# Patient Record
Sex: MALE | Race: WHITE | NOT HISPANIC OR LATINO | URBAN - METROPOLITAN AREA
[De-identification: names, ages, dates, MRNs, and addresses within clinical notes are randomized per-mention and may not be internally consistent; named-entity substitution may affect disease eponyms.]

---

## 2017-01-03 ENCOUNTER — EMERGENCY (EMERGENCY)
Facility: HOSPITAL | Age: 42
LOS: 0 days | Discharge: HOME | End: 2017-01-03
Admitting: EMERGENCY MEDICINE
Payer: COMMERCIAL

## 2017-01-03 DIAGNOSIS — R07.9 CHEST PAIN, UNSPECIFIED: ICD-10-CM

## 2017-01-03 DIAGNOSIS — I10 ESSENTIAL (PRIMARY) HYPERTENSION: ICD-10-CM

## 2017-01-03 PROCEDURE — 93010 ELECTROCARDIOGRAM REPORT: CPT

## 2017-01-03 PROCEDURE — 71020: CPT | Mod: 26

## 2017-01-03 PROCEDURE — 99285 EMERGENCY DEPT VISIT HI MDM: CPT

## 2017-01-12 ENCOUNTER — TRANSCRIPTION ENCOUNTER (OUTPATIENT)
Age: 42
End: 2017-01-12

## 2017-05-17 ENCOUNTER — OUTPATIENT (OUTPATIENT)
Dept: OUTPATIENT SERVICES | Facility: HOSPITAL | Age: 42
LOS: 1 days | Discharge: HOME | End: 2017-05-17
Payer: COMMERCIAL

## 2017-05-17 VITALS
HEIGHT: 73 IN | WEIGHT: 259.93 LBS | SYSTOLIC BLOOD PRESSURE: 120 MMHG | RESPIRATION RATE: 15 BRPM | OXYGEN SATURATION: 98 % | TEMPERATURE: 98 F | HEART RATE: 76 BPM | DIASTOLIC BLOOD PRESSURE: 75 MMHG

## 2017-05-17 DIAGNOSIS — M50.223 OTHER CERVICAL DISC DISPLACEMENT AT C6-C7 LEVEL: ICD-10-CM

## 2017-05-17 DIAGNOSIS — Z92.241 PERSONAL HISTORY OF SYSTEMIC STEROID THERAPY: Chronic | ICD-10-CM

## 2017-05-17 DIAGNOSIS — Z98.890 OTHER SPECIFIED POSTPROCEDURAL STATES: Chronic | ICD-10-CM

## 2017-05-17 DIAGNOSIS — Z98.1 ARTHRODESIS STATUS: Chronic | ICD-10-CM

## 2017-05-17 DIAGNOSIS — M50.222 OTHER CERVICAL DISC DISPLACEMENT AT C5-C6 LEVEL: ICD-10-CM

## 2017-05-17 DIAGNOSIS — M54.12 RADICULOPATHY, CERVICAL REGION: ICD-10-CM

## 2017-05-17 LAB
ANION GAP SERPL CALC-SCNC: 9 MMOL/L — SIGNIFICANT CHANGE UP (ref 5–17)
APPEARANCE UR: CLEAR — SIGNIFICANT CHANGE UP
APTT BLD: 35.4 SEC — SIGNIFICANT CHANGE UP (ref 27.5–37.4)
BASOPHILS # BLD AUTO: 0.2 K/UL — SIGNIFICANT CHANGE UP (ref 0–0.2)
BASOPHILS NFR BLD AUTO: 1.7 % — SIGNIFICANT CHANGE UP (ref 0–2)
BILIRUB UR-MCNC: NEGATIVE — SIGNIFICANT CHANGE UP
BUN SERPL-MCNC: 8 MG/DL — SIGNIFICANT CHANGE UP (ref 7–23)
CALCIUM SERPL-MCNC: 9.1 MG/DL — SIGNIFICANT CHANGE UP (ref 8.5–10.1)
CHLORIDE SERPL-SCNC: 107 MMOL/L — SIGNIFICANT CHANGE UP (ref 96–108)
CO2 SERPL-SCNC: 25 MMOL/L — SIGNIFICANT CHANGE UP (ref 22–31)
COLOR SPEC: YELLOW — SIGNIFICANT CHANGE UP
CREAT SERPL-MCNC: 1.01 MG/DL — SIGNIFICANT CHANGE UP (ref 0.5–1.3)
DIFF PNL FLD: NEGATIVE — SIGNIFICANT CHANGE UP
EOSINOPHIL # BLD AUTO: 0.1 K/UL — SIGNIFICANT CHANGE UP (ref 0–0.5)
EOSINOPHIL NFR BLD AUTO: 1 % — SIGNIFICANT CHANGE UP (ref 0–6)
GLUCOSE SERPL-MCNC: 104 MG/DL — HIGH (ref 70–99)
GLUCOSE UR QL: NEGATIVE MG/DL — SIGNIFICANT CHANGE UP
HCT VFR BLD CALC: 51.7 % — HIGH (ref 39–50)
HGB BLD-MCNC: 17.5 G/DL — HIGH (ref 13–17)
INR BLD: 1.07 RATIO — SIGNIFICANT CHANGE UP (ref 0.88–1.16)
KETONES UR-MCNC: NEGATIVE — SIGNIFICANT CHANGE UP
LEUKOCYTE ESTERASE UR-ACNC: NEGATIVE — SIGNIFICANT CHANGE UP
LYMPHOCYTES # BLD AUTO: 1.9 K/UL — SIGNIFICANT CHANGE UP (ref 1–3.3)
LYMPHOCYTES # BLD AUTO: 19.4 % — SIGNIFICANT CHANGE UP (ref 13–44)
MCHC RBC-ENTMCNC: 30.7 PG — SIGNIFICANT CHANGE UP (ref 27–34)
MCHC RBC-ENTMCNC: 33.8 GM/DL — SIGNIFICANT CHANGE UP (ref 32–36)
MCV RBC AUTO: 90.8 FL — SIGNIFICANT CHANGE UP (ref 80–100)
MONOCYTES # BLD AUTO: 0.7 K/UL — SIGNIFICANT CHANGE UP (ref 0–0.9)
MONOCYTES NFR BLD AUTO: 6.9 % — SIGNIFICANT CHANGE UP (ref 2–14)
MRSA PCR RESULT.: SIGNIFICANT CHANGE UP
NEUTROPHILS # BLD AUTO: 6.9 K/UL — SIGNIFICANT CHANGE UP (ref 1.8–7.4)
NEUTROPHILS NFR BLD AUTO: 71 % — SIGNIFICANT CHANGE UP (ref 43–77)
NITRITE UR-MCNC: NEGATIVE — SIGNIFICANT CHANGE UP
PH UR: 6 — SIGNIFICANT CHANGE UP (ref 5–8)
PLATELET # BLD AUTO: 204 K/UL — SIGNIFICANT CHANGE UP (ref 150–400)
POTASSIUM SERPL-MCNC: 4 MMOL/L — SIGNIFICANT CHANGE UP (ref 3.5–5.3)
POTASSIUM SERPL-SCNC: 4 MMOL/L — SIGNIFICANT CHANGE UP (ref 3.5–5.3)
PROT UR-MCNC: NEGATIVE MG/DL — SIGNIFICANT CHANGE UP
PROTHROM AB SERPL-ACNC: 11.6 SEC — SIGNIFICANT CHANGE UP (ref 9.8–12.7)
RBC # BLD: 5.7 M/UL — SIGNIFICANT CHANGE UP (ref 4.2–5.8)
RBC # FLD: 12 % — SIGNIFICANT CHANGE UP (ref 10.3–14.5)
S AUREUS DNA NOSE QL NAA+PROBE: SIGNIFICANT CHANGE UP
SODIUM SERPL-SCNC: 141 MMOL/L — SIGNIFICANT CHANGE UP (ref 135–145)
SP GR SPEC: 1.01 — SIGNIFICANT CHANGE UP (ref 1.01–1.02)
TYPE + AB SCN PNL BLD: SIGNIFICANT CHANGE UP
UROBILINOGEN FLD QL: NEGATIVE MG/DL — SIGNIFICANT CHANGE UP
WBC # BLD: 9.7 K/UL — SIGNIFICANT CHANGE UP (ref 3.8–10.5)
WBC # FLD AUTO: 9.7 K/UL — SIGNIFICANT CHANGE UP (ref 3.8–10.5)

## 2017-05-17 PROCEDURE — 71020: CPT | Mod: 26

## 2017-05-17 PROCEDURE — 93010 ELECTROCARDIOGRAM REPORT: CPT

## 2017-05-17 NOTE — H&P PST ADULT - ASSESSMENT
42 yo male scheduled for  ACDF & related procedures with Dr. Luke on 5/25/17    1. Labs as per surgeon  2. EKG  3. Medical clearance with PCP Dr Serrato  4. discussed EZ sponges, mupirocin & day of procedure instructions  5. CXR  6. instructed to bring in CPAP machine

## 2017-05-17 NOTE — H&P PST ADULT - PSH
History of back surgery  removal of cyst in lumbar spine 2012  S/P epidural steroid injection  lumbar spine  S/P lumbar fusion  2006, 2012, 2013
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
## 2017-05-17 NOTE — H&P PST ADULT - PMH
Back pain    Cervical radiculopathy    Herniated cervical disc    Neck pain    Sleep apnea  CPAP machine

## 2017-05-17 NOTE — H&P PST ADULT - FAMILY HISTORY
Mother  Still living? Yes, Estimated age: 51-60  Family history of ovarian cancer, Age at diagnosis: Age Unknown

## 2017-05-25 ENCOUNTER — INPATIENT (INPATIENT)
Facility: HOSPITAL | Age: 42
LOS: 1 days | Discharge: HOME | End: 2017-05-27
Attending: ORTHOPAEDIC SURGERY | Admitting: NEUROLOGICAL SURGERY
Payer: COMMERCIAL

## 2017-05-25 ENCOUNTER — RESULT REVIEW (OUTPATIENT)
Age: 42
End: 2017-05-25

## 2017-05-25 VITALS
SYSTOLIC BLOOD PRESSURE: 143 MMHG | DIASTOLIC BLOOD PRESSURE: 85 MMHG | HEIGHT: 73 IN | TEMPERATURE: 98 F | RESPIRATION RATE: 15 BRPM | HEART RATE: 68 BPM | WEIGHT: 259.93 LBS | OXYGEN SATURATION: 98 %

## 2017-05-25 DIAGNOSIS — Z98.890 OTHER SPECIFIED POSTPROCEDURAL STATES: Chronic | ICD-10-CM

## 2017-05-25 DIAGNOSIS — Z92.241 PERSONAL HISTORY OF SYSTEMIC STEROID THERAPY: Chronic | ICD-10-CM

## 2017-05-25 DIAGNOSIS — Z98.1 ARTHRODESIS STATUS: Chronic | ICD-10-CM

## 2017-05-25 LAB
ANION GAP SERPL CALC-SCNC: 8 MMOL/L — SIGNIFICANT CHANGE UP (ref 5–17)
BASOPHILS # BLD AUTO: 0.1 K/UL — SIGNIFICANT CHANGE UP (ref 0–0.2)
BASOPHILS NFR BLD AUTO: 1 % — SIGNIFICANT CHANGE UP (ref 0–2)
BUN SERPL-MCNC: 12 MG/DL — SIGNIFICANT CHANGE UP (ref 7–23)
CALCIUM SERPL-MCNC: 8.8 MG/DL — SIGNIFICANT CHANGE UP (ref 8.5–10.1)
CHLORIDE SERPL-SCNC: 110 MMOL/L — HIGH (ref 96–108)
CO2 SERPL-SCNC: 23 MMOL/L — SIGNIFICANT CHANGE UP (ref 22–31)
CREAT SERPL-MCNC: 1.01 MG/DL — SIGNIFICANT CHANGE UP (ref 0.5–1.3)
EOSINOPHIL # BLD AUTO: 0.1 K/UL — SIGNIFICANT CHANGE UP (ref 0–0.5)
EOSINOPHIL NFR BLD AUTO: 0.8 % — SIGNIFICANT CHANGE UP (ref 0–6)
GLUCOSE SERPL-MCNC: 122 MG/DL — HIGH (ref 70–99)
HCT VFR BLD CALC: 44.8 % — SIGNIFICANT CHANGE UP (ref 39–50)
HGB BLD-MCNC: 15.5 G/DL — SIGNIFICANT CHANGE UP (ref 13–17)
LYMPHOCYTES # BLD AUTO: 1.3 K/UL — SIGNIFICANT CHANGE UP (ref 1–3.3)
LYMPHOCYTES # BLD AUTO: 16.6 % — SIGNIFICANT CHANGE UP (ref 13–44)
MCHC RBC-ENTMCNC: 30.7 PG — SIGNIFICANT CHANGE UP (ref 27–34)
MCHC RBC-ENTMCNC: 34.7 GM/DL — SIGNIFICANT CHANGE UP (ref 32–36)
MCV RBC AUTO: 88.6 FL — SIGNIFICANT CHANGE UP (ref 80–100)
MONOCYTES # BLD AUTO: 0.2 K/UL — SIGNIFICANT CHANGE UP (ref 0–0.9)
MONOCYTES NFR BLD AUTO: 3.1 % — SIGNIFICANT CHANGE UP (ref 2–14)
NEUTROPHILS # BLD AUTO: 6.2 K/UL — SIGNIFICANT CHANGE UP (ref 1.8–7.4)
NEUTROPHILS NFR BLD AUTO: 78.5 % — HIGH (ref 43–77)
PLATELET # BLD AUTO: 184 K/UL — SIGNIFICANT CHANGE UP (ref 150–400)
POTASSIUM SERPL-MCNC: 4.8 MMOL/L — SIGNIFICANT CHANGE UP (ref 3.5–5.3)
POTASSIUM SERPL-SCNC: 4.8 MMOL/L — SIGNIFICANT CHANGE UP (ref 3.5–5.3)
RBC # BLD: 5.05 M/UL — SIGNIFICANT CHANGE UP (ref 4.2–5.8)
RBC # FLD: 11.5 % — SIGNIFICANT CHANGE UP (ref 10.3–14.5)
SODIUM SERPL-SCNC: 141 MMOL/L — SIGNIFICANT CHANGE UP (ref 135–145)
WBC # BLD: 7.9 K/UL — SIGNIFICANT CHANGE UP (ref 3.8–10.5)
WBC # FLD AUTO: 7.9 K/UL — SIGNIFICANT CHANGE UP (ref 3.8–10.5)

## 2017-05-25 PROCEDURE — 88304 TISSUE EXAM BY PATHOLOGIST: CPT | Mod: 26

## 2017-05-25 RX ORDER — CEFAZOLIN SODIUM 1 G
2000 VIAL (EA) INJECTION EVERY 8 HOURS
Qty: 0 | Refills: 0 | Status: COMPLETED | OUTPATIENT
Start: 2017-05-25 | End: 2017-05-26

## 2017-05-25 RX ORDER — MAGNESIUM HYDROXIDE 400 MG/1
30 TABLET, CHEWABLE ORAL EVERY 12 HOURS
Qty: 0 | Refills: 0 | Status: DISCONTINUED | OUTPATIENT
Start: 2017-05-25 | End: 2017-05-27

## 2017-05-25 RX ORDER — SODIUM CHLORIDE 9 MG/ML
3 INJECTION INTRAMUSCULAR; INTRAVENOUS; SUBCUTANEOUS EVERY 8 HOURS
Qty: 0 | Refills: 0 | Status: DISCONTINUED | OUTPATIENT
Start: 2017-05-25 | End: 2017-05-27

## 2017-05-25 RX ORDER — DOCUSATE SODIUM 100 MG
100 CAPSULE ORAL THREE TIMES A DAY
Qty: 0 | Refills: 0 | Status: DISCONTINUED | OUTPATIENT
Start: 2017-05-25 | End: 2017-05-27

## 2017-05-25 RX ORDER — SODIUM CHLORIDE 9 MG/ML
1000 INJECTION, SOLUTION INTRAVENOUS
Qty: 0 | Refills: 0 | Status: DISCONTINUED | OUTPATIENT
Start: 2017-05-25 | End: 2017-05-27

## 2017-05-25 RX ORDER — HYDROMORPHONE HYDROCHLORIDE 2 MG/ML
2 INJECTION INTRAMUSCULAR; INTRAVENOUS; SUBCUTANEOUS EVERY 6 HOURS
Qty: 0 | Refills: 0 | Status: DISCONTINUED | OUTPATIENT
Start: 2017-05-26 | End: 2017-05-27

## 2017-05-25 RX ORDER — CYCLOBENZAPRINE HYDROCHLORIDE 10 MG/1
10 TABLET, FILM COATED ORAL THREE TIMES A DAY
Qty: 0 | Refills: 0 | Status: DISCONTINUED | OUTPATIENT
Start: 2017-05-25 | End: 2017-05-27

## 2017-05-25 RX ORDER — ACETAMINOPHEN 500 MG
650 TABLET ORAL EVERY 6 HOURS
Qty: 0 | Refills: 0 | Status: DISCONTINUED | OUTPATIENT
Start: 2017-05-25 | End: 2017-05-27

## 2017-05-25 RX ORDER — ACETAMINOPHEN 500 MG
975 TABLET ORAL ONCE
Qty: 0 | Refills: 0 | Status: DISCONTINUED | OUTPATIENT
Start: 2017-05-25 | End: 2017-05-27

## 2017-05-25 RX ORDER — ONDANSETRON 8 MG/1
4 TABLET, FILM COATED ORAL EVERY 6 HOURS
Qty: 0 | Refills: 0 | Status: DISCONTINUED | OUTPATIENT
Start: 2017-05-25 | End: 2017-05-25

## 2017-05-25 RX ORDER — NALOXONE HYDROCHLORIDE 4 MG/.1ML
0.1 SPRAY NASAL
Qty: 0 | Refills: 0 | Status: DISCONTINUED | OUTPATIENT
Start: 2017-05-25 | End: 2017-05-27

## 2017-05-25 RX ORDER — FAMOTIDINE 10 MG/ML
20 INJECTION INTRAVENOUS EVERY 12 HOURS
Qty: 0 | Refills: 0 | Status: DISCONTINUED | OUTPATIENT
Start: 2017-05-25 | End: 2017-05-27

## 2017-05-25 RX ORDER — OXYCODONE HYDROCHLORIDE 5 MG/1
10 TABLET ORAL ONCE
Qty: 0 | Refills: 0 | Status: COMPLETED | OUTPATIENT
Start: 2017-05-25 | End: 2017-05-25

## 2017-05-25 RX ORDER — SODIUM CHLORIDE 9 MG/ML
1000 INJECTION, SOLUTION INTRAVENOUS
Qty: 0 | Refills: 0 | Status: DISCONTINUED | OUTPATIENT
Start: 2017-05-25 | End: 2017-05-26

## 2017-05-25 RX ORDER — HYDROMORPHONE HYDROCHLORIDE 2 MG/ML
30 INJECTION INTRAMUSCULAR; INTRAVENOUS; SUBCUTANEOUS
Qty: 0 | Refills: 0 | Status: DISCONTINUED | OUTPATIENT
Start: 2017-05-25 | End: 2017-05-26

## 2017-05-25 RX ORDER — ACETAMINOPHEN 500 MG
325 TABLET ORAL EVERY 4 HOURS
Qty: 0 | Refills: 0 | Status: DISCONTINUED | OUTPATIENT
Start: 2017-05-25 | End: 2017-05-27

## 2017-05-25 RX ORDER — ONDANSETRON 8 MG/1
4 TABLET, FILM COATED ORAL EVERY 6 HOURS
Qty: 0 | Refills: 0 | Status: DISCONTINUED | OUTPATIENT
Start: 2017-05-25 | End: 2017-05-27

## 2017-05-25 RX ORDER — HYDROMORPHONE HYDROCHLORIDE 2 MG/ML
0.5 INJECTION INTRAMUSCULAR; INTRAVENOUS; SUBCUTANEOUS
Qty: 0 | Refills: 0 | Status: DISCONTINUED | OUTPATIENT
Start: 2017-05-25 | End: 2017-05-26

## 2017-05-25 RX ADMIN — SODIUM CHLORIDE 75 MILLILITER(S): 9 INJECTION, SOLUTION INTRAVENOUS at 16:19

## 2017-05-25 RX ADMIN — ONDANSETRON 4 MILLIGRAM(S): 8 TABLET, FILM COATED ORAL at 23:38

## 2017-05-25 RX ADMIN — Medication 100 MILLIGRAM(S): at 21:02

## 2017-05-25 RX ADMIN — SODIUM CHLORIDE 3 MILLILITER(S): 9 INJECTION INTRAMUSCULAR; INTRAVENOUS; SUBCUTANEOUS at 21:02

## 2017-05-25 RX ADMIN — HYDROMORPHONE HYDROCHLORIDE 30 MILLILITER(S): 2 INJECTION INTRAMUSCULAR; INTRAVENOUS; SUBCUTANEOUS at 16:17

## 2017-05-26 LAB
ANION GAP SERPL CALC-SCNC: 5 MMOL/L — SIGNIFICANT CHANGE UP (ref 5–17)
BASOPHILS # BLD AUTO: 0.1 K/UL — SIGNIFICANT CHANGE UP (ref 0–0.2)
BASOPHILS NFR BLD AUTO: 0.5 % — SIGNIFICANT CHANGE UP (ref 0–2)
BUN SERPL-MCNC: 9 MG/DL — SIGNIFICANT CHANGE UP (ref 7–23)
CALCIUM SERPL-MCNC: 8.8 MG/DL — SIGNIFICANT CHANGE UP (ref 8.5–10.1)
CHLORIDE SERPL-SCNC: 106 MMOL/L — SIGNIFICANT CHANGE UP (ref 96–108)
CO2 SERPL-SCNC: 26 MMOL/L — SIGNIFICANT CHANGE UP (ref 22–31)
CREAT SERPL-MCNC: 0.99 MG/DL — SIGNIFICANT CHANGE UP (ref 0.5–1.3)
EOSINOPHIL # BLD AUTO: 0 K/UL — SIGNIFICANT CHANGE UP (ref 0–0.5)
EOSINOPHIL NFR BLD AUTO: 0 % — SIGNIFICANT CHANGE UP (ref 0–6)
GLUCOSE SERPL-MCNC: 119 MG/DL — HIGH (ref 70–99)
HCT VFR BLD CALC: 43.4 % — SIGNIFICANT CHANGE UP (ref 39–50)
HGB BLD-MCNC: 15.5 G/DL — SIGNIFICANT CHANGE UP (ref 13–17)
LYMPHOCYTES # BLD AUTO: 1.4 K/UL — SIGNIFICANT CHANGE UP (ref 1–3.3)
LYMPHOCYTES # BLD AUTO: 7.9 % — LOW (ref 13–44)
MCHC RBC-ENTMCNC: 31.3 PG — SIGNIFICANT CHANGE UP (ref 27–34)
MCHC RBC-ENTMCNC: 35.7 GM/DL — SIGNIFICANT CHANGE UP (ref 32–36)
MCV RBC AUTO: 87.7 FL — SIGNIFICANT CHANGE UP (ref 80–100)
MONOCYTES # BLD AUTO: 0.9 K/UL — SIGNIFICANT CHANGE UP (ref 0–0.9)
MONOCYTES NFR BLD AUTO: 5.1 % — SIGNIFICANT CHANGE UP (ref 2–14)
NEUTROPHILS # BLD AUTO: 15.4 K/UL — HIGH (ref 1.8–7.4)
NEUTROPHILS NFR BLD AUTO: 86.5 % — HIGH (ref 43–77)
PLATELET # BLD AUTO: 176 K/UL — SIGNIFICANT CHANGE UP (ref 150–400)
POTASSIUM SERPL-MCNC: 4.1 MMOL/L — SIGNIFICANT CHANGE UP (ref 3.5–5.3)
POTASSIUM SERPL-SCNC: 4.1 MMOL/L — SIGNIFICANT CHANGE UP (ref 3.5–5.3)
RBC # BLD: 4.95 M/UL — SIGNIFICANT CHANGE UP (ref 4.2–5.8)
RBC # FLD: 11.5 % — SIGNIFICANT CHANGE UP (ref 10.3–14.5)
SODIUM SERPL-SCNC: 137 MMOL/L — SIGNIFICANT CHANGE UP (ref 135–145)
WBC # BLD: 17.8 K/UL — HIGH (ref 3.8–10.5)
WBC # FLD AUTO: 17.8 K/UL — HIGH (ref 3.8–10.5)

## 2017-05-26 RX ORDER — PROCHLORPERAZINE MALEATE 5 MG
10 TABLET ORAL EVERY 8 HOURS
Qty: 0 | Refills: 0 | Status: DISCONTINUED | OUTPATIENT
Start: 2017-05-26 | End: 2017-05-27

## 2017-05-26 RX ADMIN — Medication 100 MILLIGRAM(S): at 05:51

## 2017-05-26 RX ADMIN — Medication 100 MILLIGRAM(S): at 22:21

## 2017-05-26 RX ADMIN — Medication 1 TABLET(S): at 22:21

## 2017-05-26 RX ADMIN — SODIUM CHLORIDE 3 MILLILITER(S): 9 INJECTION INTRAMUSCULAR; INTRAVENOUS; SUBCUTANEOUS at 22:22

## 2017-05-26 RX ADMIN — SODIUM CHLORIDE 3 MILLILITER(S): 9 INJECTION INTRAMUSCULAR; INTRAVENOUS; SUBCUTANEOUS at 05:52

## 2017-05-26 RX ADMIN — Medication 100 MILLIGRAM(S): at 14:36

## 2017-05-26 RX ADMIN — Medication 10 MILLIGRAM(S): at 00:09

## 2017-05-26 RX ADMIN — Medication 1 TABLET(S): at 14:36

## 2017-05-26 RX ADMIN — Medication 100 MILLIGRAM(S): at 06:06

## 2017-05-26 RX ADMIN — SODIUM CHLORIDE 3 MILLILITER(S): 9 INJECTION INTRAMUSCULAR; INTRAVENOUS; SUBCUTANEOUS at 14:34

## 2017-05-26 RX ADMIN — Medication 1 TABLET(S): at 06:15

## 2017-05-26 NOTE — PROGRESS NOTE ADULT - SUBJECTIVE AND OBJECTIVE BOX
POD #1  S/p ACDF    Doing OK overnight    Voided    On PCA    > 60 cc AMARILYS output    Afeb    Drsg dry  Abd soft  Slight left triceps weakness, improved numbness  No DVT  Lungs clr     - Doing well   - D/C PCA - transfer to floor   - Too much drainage to be D/C   - Will keep one more day and probably be able to D/C Drain and D/ C in AM    KOSTAS Beth MD

## 2017-05-27 VITALS
OXYGEN SATURATION: 95 % | SYSTOLIC BLOOD PRESSURE: 124 MMHG | HEART RATE: 76 BPM | DIASTOLIC BLOOD PRESSURE: 73 MMHG | TEMPERATURE: 98 F | RESPIRATION RATE: 18 BRPM

## 2017-05-27 LAB
ANION GAP SERPL CALC-SCNC: 9 MMOL/L — SIGNIFICANT CHANGE UP (ref 5–17)
BASOPHILS # BLD AUTO: 0.1 K/UL — SIGNIFICANT CHANGE UP (ref 0–0.2)
BASOPHILS NFR BLD AUTO: 0.9 % — SIGNIFICANT CHANGE UP (ref 0–2)
BUN SERPL-MCNC: 5 MG/DL — LOW (ref 7–23)
CALCIUM SERPL-MCNC: 9.2 MG/DL — SIGNIFICANT CHANGE UP (ref 8.5–10.1)
CHLORIDE SERPL-SCNC: 105 MMOL/L — SIGNIFICANT CHANGE UP (ref 96–108)
CO2 SERPL-SCNC: 28 MMOL/L — SIGNIFICANT CHANGE UP (ref 22–31)
CREAT SERPL-MCNC: 0.87 MG/DL — SIGNIFICANT CHANGE UP (ref 0.5–1.3)
EOSINOPHIL # BLD AUTO: 0.1 K/UL — SIGNIFICANT CHANGE UP (ref 0–0.5)
EOSINOPHIL NFR BLD AUTO: 0.9 % — SIGNIFICANT CHANGE UP (ref 0–6)
GLUCOSE SERPL-MCNC: 105 MG/DL — HIGH (ref 70–99)
HCT VFR BLD CALC: 44.6 % — SIGNIFICANT CHANGE UP (ref 39–50)
HGB BLD-MCNC: 15.2 G/DL — SIGNIFICANT CHANGE UP (ref 13–17)
LYMPHOCYTES # BLD AUTO: 2.7 K/UL — SIGNIFICANT CHANGE UP (ref 1–3.3)
LYMPHOCYTES # BLD AUTO: 26.9 % — SIGNIFICANT CHANGE UP (ref 13–44)
MCHC RBC-ENTMCNC: 30.8 PG — SIGNIFICANT CHANGE UP (ref 27–34)
MCHC RBC-ENTMCNC: 34.1 GM/DL — SIGNIFICANT CHANGE UP (ref 32–36)
MCV RBC AUTO: 90.4 FL — SIGNIFICANT CHANGE UP (ref 80–100)
MONOCYTES # BLD AUTO: 0.9 K/UL — SIGNIFICANT CHANGE UP (ref 0–0.9)
MONOCYTES NFR BLD AUTO: 9.3 % — SIGNIFICANT CHANGE UP (ref 2–14)
NEUTROPHILS # BLD AUTO: 6.2 K/UL — SIGNIFICANT CHANGE UP (ref 1.8–7.4)
NEUTROPHILS NFR BLD AUTO: 62 % — SIGNIFICANT CHANGE UP (ref 43–77)
PLATELET # BLD AUTO: 158 K/UL — SIGNIFICANT CHANGE UP (ref 150–400)
POTASSIUM SERPL-MCNC: 3.6 MMOL/L — SIGNIFICANT CHANGE UP (ref 3.5–5.3)
POTASSIUM SERPL-SCNC: 3.6 MMOL/L — SIGNIFICANT CHANGE UP (ref 3.5–5.3)
RBC # BLD: 4.93 M/UL — SIGNIFICANT CHANGE UP (ref 4.2–5.8)
RBC # FLD: 11.7 % — SIGNIFICANT CHANGE UP (ref 10.3–14.5)
SODIUM SERPL-SCNC: 142 MMOL/L — SIGNIFICANT CHANGE UP (ref 135–145)
WBC # BLD: 10 K/UL — SIGNIFICANT CHANGE UP (ref 3.8–10.5)
WBC # FLD AUTO: 10 K/UL — SIGNIFICANT CHANGE UP (ref 3.8–10.5)

## 2017-05-27 RX ORDER — CYCLOBENZAPRINE HYDROCHLORIDE 10 MG/1
10 TABLET, FILM COATED ORAL
Qty: 0 | Refills: 0 | COMMUNITY

## 2017-05-27 RX ORDER — CETIRIZINE HYDROCHLORIDE 10 MG/1
1 TABLET ORAL
Qty: 0 | Refills: 0 | COMMUNITY

## 2017-05-27 RX ADMIN — Medication 1 TABLET(S): at 05:28

## 2017-05-27 RX ADMIN — SODIUM CHLORIDE 3 MILLILITER(S): 9 INJECTION INTRAMUSCULAR; INTRAVENOUS; SUBCUTANEOUS at 05:32

## 2017-05-27 RX ADMIN — Medication 100 MILLIGRAM(S): at 05:28

## 2017-05-27 NOTE — DISCHARGE NOTE ADULT - CARE PLAN
Principal Discharge DX:	Chronic bilateral low back pain without sciatica  Goal:	fusion  Instructions for follow-up, activity and diet:	keep wound clean and dry x 48 hours  Goal:	fusion

## 2017-05-27 NOTE — DISCHARGE NOTE ADULT - CARE PROVIDER_API CALL
Aurelio Luke), Orthopaedic Surgery  [address] [place] [address]  Phone: [phone]  Fax: [phone]
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
## 2017-05-27 NOTE — DISCHARGE NOTE ADULT - PATIENT PORTAL LINK FT
“You can access the Follow[facility] Patient Portal, offered by [facility], by registering with the following website: [email or web address]”

## 2017-05-31 DIAGNOSIS — G47.33 OBSTRUCTIVE SLEEP APNEA (ADULT) (PEDIATRIC): ICD-10-CM

## 2017-05-31 DIAGNOSIS — M48.02 SPINAL STENOSIS, CERVICAL REGION: ICD-10-CM

## 2017-05-31 DIAGNOSIS — I10 ESSENTIAL (PRIMARY) HYPERTENSION: ICD-10-CM

## 2017-05-31 DIAGNOSIS — M50.122 CERVICAL DISC DISORDER AT C5-C6 LEVEL WITH RADICULOPATHY: ICD-10-CM

## 2017-05-31 DIAGNOSIS — M50.323 OTHER CERVICAL DISC DEGENERATION AT C6-C7 LEVEL: ICD-10-CM

## 2017-05-31 DIAGNOSIS — M43.22 FUSION OF SPINE, CERVICAL REGION: ICD-10-CM

## 2017-05-31 DIAGNOSIS — M50.123 CERVICAL DISC DISORDER AT C6-C7 LEVEL WITH RADICULOPATHY: ICD-10-CM

## 2017-05-31 LAB — SURGICAL PATHOLOGY FINAL REPORT - CH: SIGNIFICANT CHANGE UP

## 2017-11-16 ENCOUNTER — OUTPATIENT (OUTPATIENT)
Dept: OUTPATIENT SERVICES | Facility: HOSPITAL | Age: 42
LOS: 1 days | End: 2017-11-16
Payer: COMMERCIAL

## 2017-11-16 VITALS
TEMPERATURE: 99 F | HEART RATE: 70 BPM | OXYGEN SATURATION: 95 % | RESPIRATION RATE: 16 BRPM | HEIGHT: 74 IN | DIASTOLIC BLOOD PRESSURE: 77 MMHG | SYSTOLIC BLOOD PRESSURE: 121 MMHG | WEIGHT: 261.03 LBS

## 2017-11-16 DIAGNOSIS — Z98.890 OTHER SPECIFIED POSTPROCEDURAL STATES: Chronic | ICD-10-CM

## 2017-11-16 DIAGNOSIS — Z01.818 ENCOUNTER FOR OTHER PREPROCEDURAL EXAMINATION: ICD-10-CM

## 2017-11-16 DIAGNOSIS — Z98.1 ARTHRODESIS STATUS: Chronic | ICD-10-CM

## 2017-11-16 DIAGNOSIS — M54.16 RADICULOPATHY, LUMBAR REGION: ICD-10-CM

## 2017-11-16 DIAGNOSIS — Z92.241 PERSONAL HISTORY OF SYSTEMIC STEROID THERAPY: Chronic | ICD-10-CM

## 2017-11-16 LAB
ANION GAP SERPL CALC-SCNC: 8 MMOL/L — SIGNIFICANT CHANGE UP (ref 5–17)
APTT BLD: 37.4 SEC — SIGNIFICANT CHANGE UP (ref 27.5–37.4)
BUN SERPL-MCNC: 10 MG/DL — SIGNIFICANT CHANGE UP (ref 7–23)
CALCIUM SERPL-MCNC: 9.4 MG/DL — SIGNIFICANT CHANGE UP (ref 8.4–10.5)
CHLORIDE SERPL-SCNC: 101 MMOL/L — SIGNIFICANT CHANGE UP (ref 96–108)
CO2 SERPL-SCNC: 27 MMOL/L — SIGNIFICANT CHANGE UP (ref 22–31)
CREAT SERPL-MCNC: 0.97 MG/DL — SIGNIFICANT CHANGE UP (ref 0.5–1.3)
GLUCOSE SERPL-MCNC: 113 MG/DL — HIGH (ref 70–99)
HCT VFR BLD CALC: 44.8 % — SIGNIFICANT CHANGE UP (ref 39–50)
HGB BLD-MCNC: 15.3 G/DL — SIGNIFICANT CHANGE UP (ref 13–17)
INR BLD: 1.05 RATIO — SIGNIFICANT CHANGE UP (ref 0.88–1.16)
MCHC RBC-ENTMCNC: 30.8 PG — SIGNIFICANT CHANGE UP (ref 27–34)
MCHC RBC-ENTMCNC: 34.2 GM/DL — SIGNIFICANT CHANGE UP (ref 32–36)
MCV RBC AUTO: 90.2 FL — SIGNIFICANT CHANGE UP (ref 80–100)
PLATELET # BLD AUTO: 206 K/UL — SIGNIFICANT CHANGE UP (ref 150–400)
POTASSIUM SERPL-MCNC: 4.3 MMOL/L — SIGNIFICANT CHANGE UP (ref 3.5–5.3)
POTASSIUM SERPL-SCNC: 4.3 MMOL/L — SIGNIFICANT CHANGE UP (ref 3.5–5.3)
PROTHROM AB SERPL-ACNC: 11.5 SEC — SIGNIFICANT CHANGE UP (ref 9.8–12.7)
RBC # BLD: 4.96 M/UL — SIGNIFICANT CHANGE UP (ref 4.2–5.8)
RBC # FLD: 11.3 % — SIGNIFICANT CHANGE UP (ref 10.3–14.5)
SODIUM SERPL-SCNC: 136 MMOL/L — SIGNIFICANT CHANGE UP (ref 135–145)
WBC # BLD: 6.5 K/UL — SIGNIFICANT CHANGE UP (ref 3.8–10.5)
WBC # FLD AUTO: 6.5 K/UL — SIGNIFICANT CHANGE UP (ref 3.8–10.5)

## 2017-11-16 PROCEDURE — 93010 ELECTROCARDIOGRAM REPORT: CPT | Mod: NC

## 2017-11-16 NOTE — H&P PST ADULT - MUSCULOSKELETAL
detailed exam no calf tenderness/decreased ROM/no joint swelling/no joint warmth/generalized decreased strength and ROM secondary to neck and back pain/decreased ROM due to pain/no joint erythema/diminished strength details…

## 2017-11-16 NOTE — H&P PST ADULT - PSH
History of back surgery  removal of cyst in lumbar spine 2012  S/P cervical spinal fusion  C4-5 and C5-6, May 2017  S/P epidural steroid injection  lumbar spine  S/P lumbar fusion  2006, 2012, 2013
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
## 2017-11-16 NOTE — H&P PST ADULT - FAMILY HISTORY
Mother  Still living? Yes, Estimated age: 51-60  Family history of ovarian cancer, Age at diagnosis: Age Unknown  Family history of thyroid disease, Age at diagnosis: Age Unknown

## 2017-11-27 ENCOUNTER — OUTPATIENT (OUTPATIENT)
Dept: OUTPATIENT SERVICES | Facility: HOSPITAL | Age: 42
LOS: 1 days | End: 2017-11-27
Payer: COMMERCIAL

## 2017-11-27 DIAGNOSIS — Z98.890 OTHER SPECIFIED POSTPROCEDURAL STATES: Chronic | ICD-10-CM

## 2017-11-27 DIAGNOSIS — Z98.1 ARTHRODESIS STATUS: Chronic | ICD-10-CM

## 2017-11-27 DIAGNOSIS — M54.16 RADICULOPATHY, LUMBAR REGION: ICD-10-CM

## 2017-11-27 DIAGNOSIS — Z92.241 PERSONAL HISTORY OF SYSTEMIC STEROID THERAPY: Chronic | ICD-10-CM

## 2017-11-27 PROCEDURE — 72020 X-RAY EXAM OF SPINE 1 VIEW: CPT | Mod: 26

## 2017-11-27 PROCEDURE — 63650 IMPLANT NEUROELECTRODES: CPT

## 2017-11-27 PROCEDURE — C1778: CPT

## 2018-01-10 ENCOUNTER — APPOINTMENT (OUTPATIENT)
Dept: SPINE | Facility: CLINIC | Age: 43
End: 2018-01-10
Payer: COMMERCIAL

## 2018-01-10 VITALS
DIASTOLIC BLOOD PRESSURE: 77 MMHG | SYSTOLIC BLOOD PRESSURE: 107 MMHG | BODY MASS INDEX: 33.37 KG/M2 | HEART RATE: 73 BPM | WEIGHT: 260 LBS | HEIGHT: 74 IN

## 2018-01-10 DIAGNOSIS — Z78.9 OTHER SPECIFIED HEALTH STATUS: ICD-10-CM

## 2018-01-10 DIAGNOSIS — Z82.49 FAMILY HISTORY OF ISCHEMIC HEART DISEASE AND OTHER DISEASES OF THE CIRCULATORY SYSTEM: ICD-10-CM

## 2018-01-10 DIAGNOSIS — M54.42 LUMBAGO WITH SCIATICA, LEFT SIDE: ICD-10-CM

## 2018-01-10 DIAGNOSIS — Z82.3 FAMILY HISTORY OF STROKE: ICD-10-CM

## 2018-01-10 DIAGNOSIS — Z80.1 FAMILY HISTORY OF MALIGNANT NEOPLASM OF TRACHEA, BRONCHUS AND LUNG: ICD-10-CM

## 2018-01-10 DIAGNOSIS — G89.29 LUMBAGO WITH SCIATICA, LEFT SIDE: ICD-10-CM

## 2018-01-10 DIAGNOSIS — Z83.3 FAMILY HISTORY OF DIABETES MELLITUS: ICD-10-CM

## 2018-01-10 PROCEDURE — 99244 OFF/OP CNSLTJ NEW/EST MOD 40: CPT

## 2018-01-10 RX ORDER — IBUPROFEN 200 MG/1
TABLET, COATED ORAL
Refills: 0 | Status: ACTIVE | COMMUNITY

## 2018-01-10 RX ORDER — CYCLOBENZAPRINE HYDROCHLORIDE 10 MG/1
10 TABLET, FILM COATED ORAL
Qty: 90 | Refills: 0 | Status: ACTIVE | COMMUNITY
Start: 2017-11-06

## 2018-01-10 RX ORDER — ACETAMINOPHEN 325 MG/1
TABLET, FILM COATED ORAL
Refills: 0 | Status: ACTIVE | COMMUNITY

## 2018-01-25 ENCOUNTER — TRANSCRIPTION ENCOUNTER (OUTPATIENT)
Age: 43
End: 2018-01-25

## 2018-02-02 ENCOUNTER — OUTPATIENT (OUTPATIENT)
Dept: OUTPATIENT SERVICES | Facility: HOSPITAL | Age: 43
LOS: 1 days | End: 2018-02-02
Payer: COMMERCIAL

## 2018-02-02 DIAGNOSIS — M54.12 RADICULOPATHY, CERVICAL REGION: ICD-10-CM

## 2018-02-02 DIAGNOSIS — Z98.1 ARTHRODESIS STATUS: Chronic | ICD-10-CM

## 2018-02-02 DIAGNOSIS — Z92.241 PERSONAL HISTORY OF SYSTEMIC STEROID THERAPY: Chronic | ICD-10-CM

## 2018-02-02 DIAGNOSIS — Z98.890 OTHER SPECIFIED POSTPROCEDURAL STATES: Chronic | ICD-10-CM

## 2018-02-02 PROCEDURE — 85027 COMPLETE CBC AUTOMATED: CPT

## 2018-02-02 PROCEDURE — 80048 BASIC METABOLIC PNL TOTAL CA: CPT

## 2018-02-02 PROCEDURE — 85610 PROTHROMBIN TIME: CPT

## 2018-02-02 PROCEDURE — G0463: CPT

## 2018-02-02 PROCEDURE — 93005 ELECTROCARDIOGRAM TRACING: CPT

## 2018-02-02 PROCEDURE — 62321 NJX INTERLAMINAR CRV/THRC: CPT

## 2018-02-02 PROCEDURE — 76000 FLUOROSCOPY <1 HR PHYS/QHP: CPT

## 2018-02-02 PROCEDURE — 85730 THROMBOPLASTIN TIME PARTIAL: CPT

## 2018-02-02 PROCEDURE — 72020 X-RAY EXAM OF SPINE 1 VIEW: CPT

## 2018-02-02 PROCEDURE — 77003 FLUOROGUIDE FOR SPINE INJECT: CPT

## 2018-02-16 ENCOUNTER — OUTPATIENT (OUTPATIENT)
Dept: OUTPATIENT SERVICES | Facility: HOSPITAL | Age: 43
LOS: 1 days | End: 2018-02-16
Payer: COMMERCIAL

## 2018-02-16 VITALS
RESPIRATION RATE: 18 BRPM | OXYGEN SATURATION: 98 % | HEIGHT: 74.5 IN | HEART RATE: 82 BPM | SYSTOLIC BLOOD PRESSURE: 109 MMHG | DIASTOLIC BLOOD PRESSURE: 77 MMHG | WEIGHT: 255.07 LBS | TEMPERATURE: 98 F

## 2018-02-16 DIAGNOSIS — Z01.818 ENCOUNTER FOR OTHER PREPROCEDURAL EXAMINATION: ICD-10-CM

## 2018-02-16 DIAGNOSIS — Z98.1 ARTHRODESIS STATUS: Chronic | ICD-10-CM

## 2018-02-16 DIAGNOSIS — Z98.890 OTHER SPECIFIED POSTPROCEDURAL STATES: Chronic | ICD-10-CM

## 2018-02-16 DIAGNOSIS — M54.16 RADICULOPATHY, LUMBAR REGION: ICD-10-CM

## 2018-02-16 DIAGNOSIS — Z92.241 PERSONAL HISTORY OF SYSTEMIC STEROID THERAPY: Chronic | ICD-10-CM

## 2018-02-16 DIAGNOSIS — M54.42 LUMBAGO WITH SCIATICA, LEFT SIDE: ICD-10-CM

## 2018-02-16 LAB
HCT VFR BLD CALC: 46.3 % — SIGNIFICANT CHANGE UP (ref 39–50)
HGB BLD-MCNC: 15.6 G/DL — SIGNIFICANT CHANGE UP (ref 13–17)
MCHC RBC-ENTMCNC: 30.8 PG — SIGNIFICANT CHANGE UP (ref 27–34)
MCHC RBC-ENTMCNC: 33.7 GM/DL — SIGNIFICANT CHANGE UP (ref 32–36)
MCV RBC AUTO: 91.3 FL — SIGNIFICANT CHANGE UP (ref 80–100)
PLATELET # BLD AUTO: 239 K/UL — SIGNIFICANT CHANGE UP (ref 150–400)
RBC # BLD: 5.07 M/UL — SIGNIFICANT CHANGE UP (ref 4.2–5.8)
RBC # FLD: 13 % — SIGNIFICANT CHANGE UP (ref 10.3–14.5)
WBC # BLD: 8.27 K/UL — SIGNIFICANT CHANGE UP (ref 3.8–10.5)
WBC # FLD AUTO: 8.27 K/UL — SIGNIFICANT CHANGE UP (ref 3.8–10.5)

## 2018-02-16 RX ORDER — CEFAZOLIN SODIUM 1 G
2000 VIAL (EA) INJECTION ONCE
Qty: 0 | Refills: 0 | Status: DISCONTINUED | OUTPATIENT
Start: 2018-02-23 | End: 2018-03-10
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
## 2018-02-16 NOTE — H&P PST ADULT - ACTIVITY
able to walk around indoors in the house and have sexual relations, all other activities has pain in the back and legs

## 2018-02-16 NOTE — H&P PST ADULT - ENERGY EXPENDITURE (METS)
chronic back pain limiting activity, had a negative cardiac w/u approximately 3 years ago <4 as per [facility]

## 2018-02-16 NOTE — H&P PST ADULT - PMH
Back pain    Cervical radiculopathy    Herniated cervical disc    Hypertension  off medication for the last year  Lumbar nerve root impingement    Neck pain    LISBET on CPAP  severe LISBET  Sciatic leg pain  bilateral

## 2018-02-23 ENCOUNTER — OUTPATIENT (OUTPATIENT)
Dept: OUTPATIENT SERVICES | Facility: HOSPITAL | Age: 43
LOS: 1 days | End: 2018-02-23
Payer: COMMERCIAL

## 2018-02-23 ENCOUNTER — TRANSCRIPTION ENCOUNTER (OUTPATIENT)
Age: 43
End: 2018-02-23

## 2018-02-23 ENCOUNTER — APPOINTMENT (OUTPATIENT)
Dept: SPINE | Facility: HOSPITAL | Age: 43
End: 2018-02-23

## 2018-02-23 VITALS
SYSTOLIC BLOOD PRESSURE: 118 MMHG | OXYGEN SATURATION: 98 % | TEMPERATURE: 98 F | HEART RATE: 82 BPM | DIASTOLIC BLOOD PRESSURE: 80 MMHG | WEIGHT: 255.07 LBS | RESPIRATION RATE: 20 BRPM | HEIGHT: 74.5 IN

## 2018-02-23 VITALS
HEART RATE: 55 BPM | DIASTOLIC BLOOD PRESSURE: 60 MMHG | OXYGEN SATURATION: 97 % | RESPIRATION RATE: 18 BRPM | TEMPERATURE: 97 F | SYSTOLIC BLOOD PRESSURE: 113 MMHG

## 2018-02-23 DIAGNOSIS — Z98.1 ARTHRODESIS STATUS: Chronic | ICD-10-CM

## 2018-02-23 DIAGNOSIS — M54.42 LUMBAGO WITH SCIATICA, LEFT SIDE: ICD-10-CM

## 2018-02-23 DIAGNOSIS — Z92.241 PERSONAL HISTORY OF SYSTEMIC STEROID THERAPY: Chronic | ICD-10-CM

## 2018-02-23 DIAGNOSIS — Z98.890 OTHER SPECIFIED POSTPROCEDURAL STATES: Chronic | ICD-10-CM

## 2018-02-23 PROCEDURE — G0463: CPT

## 2018-02-23 PROCEDURE — 63685 INS/RPLC SPI NPG/RCVR POCKET: CPT

## 2018-02-23 PROCEDURE — 85027 COMPLETE CBC AUTOMATED: CPT

## 2018-02-23 PROCEDURE — 63650 IMPLANT NEUROELECTRODES: CPT | Mod: 50

## 2018-02-23 PROCEDURE — 63650 IMPLANT NEUROELECTRODES: CPT

## 2018-02-23 PROCEDURE — 95972 ALYS CPLX SP/PN NPGT W/PRGRM: CPT

## 2018-02-23 PROCEDURE — C1820: CPT

## 2018-02-23 PROCEDURE — 76000 FLUOROSCOPY <1 HR PHYS/QHP: CPT

## 2018-02-23 PROCEDURE — C1889: CPT

## 2018-02-23 PROCEDURE — C1787: CPT

## 2018-02-23 PROCEDURE — C1778: CPT

## 2018-02-23 RX ORDER — LIDOCAINE HCL 20 MG/ML
0.2 VIAL (ML) INJECTION ONCE
Qty: 0 | Refills: 0 | Status: DISCONTINUED | OUTPATIENT
Start: 2018-02-23 | End: 2018-02-23

## 2018-02-23 RX ORDER — SODIUM CHLORIDE 9 MG/ML
3 INJECTION INTRAMUSCULAR; INTRAVENOUS; SUBCUTANEOUS EVERY 8 HOURS
Qty: 0 | Refills: 0 | Status: DISCONTINUED | OUTPATIENT
Start: 2018-02-23 | End: 2018-02-23

## 2018-02-23 RX ORDER — CETIRIZINE HYDROCHLORIDE 10 MG/1
1 TABLET ORAL
Qty: 0 | Refills: 0 | COMMUNITY

## 2018-02-23 RX ORDER — HYDROMORPHONE HYDROCHLORIDE 2 MG/ML
0.5 INJECTION INTRAMUSCULAR; INTRAVENOUS; SUBCUTANEOUS
Qty: 0 | Refills: 0 | Status: DISCONTINUED | OUTPATIENT
Start: 2018-02-23 | End: 2018-02-23

## 2018-02-23 RX ORDER — ONDANSETRON 8 MG/1
4 TABLET, FILM COATED ORAL ONCE
Qty: 0 | Refills: 0 | Status: DISCONTINUED | OUTPATIENT
Start: 2018-02-23 | End: 2018-02-23

## 2018-02-23 RX ORDER — OXYCODONE HYDROCHLORIDE 5 MG/1
5 TABLET ORAL ONCE
Qty: 0 | Refills: 0 | Status: DISCONTINUED | OUTPATIENT
Start: 2018-02-23 | End: 2018-02-23

## 2018-02-23 RX ORDER — CYCLOBENZAPRINE HYDROCHLORIDE 10 MG/1
10 TABLET, FILM COATED ORAL THREE TIMES A DAY
Qty: 0 | Refills: 0 | Status: DISCONTINUED | OUTPATIENT
Start: 2018-02-23 | End: 2018-03-10

## 2018-02-23 RX ORDER — OXYCODONE HYDROCHLORIDE 5 MG/1
10 TABLET ORAL ONCE
Qty: 0 | Refills: 0 | Status: DISCONTINUED | OUTPATIENT
Start: 2018-02-23 | End: 2018-02-23

## 2018-02-23 RX ORDER — OXYCODONE HYDROCHLORIDE 5 MG/1
1 TABLET ORAL
Qty: 18 | Refills: 0 | OUTPATIENT
Start: 2018-02-23 | End: 2018-02-25

## 2018-02-23 RX ORDER — CEPHALEXIN 500 MG
500 CAPSULE ORAL
Qty: 0 | Refills: 0 | Status: DISCONTINUED | OUTPATIENT
Start: 2018-02-24 | End: 2018-03-10

## 2018-02-23 RX ORDER — OXYCODONE HYDROCHLORIDE 5 MG/1
1 TABLET ORAL
Qty: 24 | Refills: 0 | OUTPATIENT
Start: 2018-02-23 | End: 2018-02-25

## 2018-02-23 RX ORDER — CYCLOBENZAPRINE HYDROCHLORIDE 10 MG/1
1 TABLET, FILM COATED ORAL
Qty: 9 | Refills: 0 | OUTPATIENT
Start: 2018-02-23 | End: 2018-02-25

## 2018-02-23 RX ORDER — CYCLOBENZAPRINE HYDROCHLORIDE 10 MG/1
1 TABLET, FILM COATED ORAL
Qty: 0 | Refills: 0 | COMMUNITY

## 2018-02-23 RX ORDER — CEPHALEXIN 500 MG
1 CAPSULE ORAL
Qty: 12 | Refills: 0 | OUTPATIENT
Start: 2018-02-23 | End: 2018-02-25

## 2018-02-23 RX ORDER — DEXTROSE MONOHYDRATE, SODIUM CHLORIDE, AND POTASSIUM CHLORIDE 50; .745; 4.5 G/1000ML; G/1000ML; G/1000ML
1000 INJECTION, SOLUTION INTRAVENOUS
Qty: 0 | Refills: 0 | Status: DISCONTINUED | OUTPATIENT
Start: 2018-02-23 | End: 2018-03-10

## 2018-02-23 RX ORDER — ACETAMINOPHEN 500 MG
1000 TABLET ORAL ONCE
Qty: 0 | Refills: 0 | Status: COMPLETED | OUTPATIENT
Start: 2018-02-23 | End: 2018-02-23

## 2018-02-23 RX ADMIN — DEXTROSE MONOHYDRATE, SODIUM CHLORIDE, AND POTASSIUM CHLORIDE 125 MILLILITER(S): 50; .745; 4.5 INJECTION, SOLUTION INTRAVENOUS at 17:51

## 2018-02-23 RX ADMIN — Medication 400 MILLIGRAM(S): at 16:57

## 2018-02-23 RX ADMIN — OXYCODONE HYDROCHLORIDE 10 MILLIGRAM(S): 5 TABLET ORAL at 17:50

## 2018-02-23 RX ADMIN — Medication 1000 MILLIGRAM(S): at 16:59

## 2018-02-23 NOTE — BRIEF OPERATIVE NOTE - PROCEDURE
<<-----Click on this checkbox to enter Procedure Implantation of spinal cord stimulator into thoracolumbar spine  02/23/2018    Active  [id number]

## 2018-02-23 NOTE — ASU DISCHARGE PLAN (ADULT/PEDIATRIC). - NOTIFY
Unable to Urinate/Pain not relieved by Medications/Fever greater than 101/Bleeding that does not stop/Numbness, color, or temperature change to extremity/Swelling that continues/Persistent Nausea and Vomiting

## 2018-02-23 NOTE — ASU DISCHARGE PLAN (ADULT/PEDIATRIC). - SPECIAL INSTRUCTIONS
Your permanent spinal cord stimulator was placed today.  You may remove your dressing on Sunday evening.   Let the Steri Strips fall off.  You may shower on Tuesday.  Please see Dr. Germain in the office in 10-14 days for an incision check and postoperative visit.   We have given you oxycodone for postoperative pain.  Take your postoperative antibiotics as prescribed.

## 2018-02-23 NOTE — ASU DISCHARGE PLAN (ADULT/PEDIATRIC). - MEDICATION SUMMARY - MEDICATIONS TO TAKE
I will START or STAY ON the medications listed below when I get home from the hospital:    Tylenol 500 mg oral tablet  -- 2 tab(s) by mouth every 4 to 6 hours, As Needed - for severe pain  -- Indication: For Pain    oxyCODONE 5 mg oral capsule  -- Take 1 tablet every 4-6 hours for moderate pain. You may take an additional tablet for severe pain. Do not exceed 8 tablets/day. MDD:8  -- Indication: For Pain    cephalexin 500 mg oral capsule  -- 1 cap(s) by mouth 4 times a day  -- Indication: For Postoperative antibiotic    cyclobenzaprine 10 mg oral tablet  -- 1 tab(s) by mouth 3 times a day, As needed, Muscle Spasm  -- Indication: For Muscle spasms

## 2018-03-02 ENCOUNTER — APPOINTMENT (OUTPATIENT)
Dept: SPINE | Facility: CLINIC | Age: 43
End: 2018-03-02
Payer: COMMERCIAL

## 2018-03-02 VITALS
TEMPERATURE: 97.9 F | SYSTOLIC BLOOD PRESSURE: 124 MMHG | HEART RATE: 91 BPM | RESPIRATION RATE: 16 BRPM | HEIGHT: 74.5 IN | WEIGHT: 255 LBS | BODY MASS INDEX: 32.38 KG/M2 | OXYGEN SATURATION: 97 % | DIASTOLIC BLOOD PRESSURE: 88 MMHG

## 2018-03-02 DIAGNOSIS — G89.29 DORSALGIA, UNSPECIFIED: ICD-10-CM

## 2018-03-02 DIAGNOSIS — M54.9 DORSALGIA, UNSPECIFIED: ICD-10-CM

## 2018-03-02 PROCEDURE — 99024 POSTOP FOLLOW-UP VISIT: CPT

## 2018-03-02 RX ORDER — TAPENTADOL HYDROCHLORIDE 75 MG/1
75 TABLET, FILM COATED ORAL
Qty: 60 | Refills: 0 | Status: COMPLETED | COMMUNITY
Start: 2018-01-09 | End: 2018-03-02

## 2018-03-02 RX ORDER — CETIRIZINE HYDROCHLORIDE 10 MG/1
10 TABLET, FILM COATED ORAL
Refills: 0 | Status: ACTIVE | COMMUNITY

## 2018-07-16 PROBLEM — G47.30 SLEEP APNEA, UNSPECIFIED: Chronic | Status: INACTIVE | Noted: 2017-05-17 | Resolved: 2017-11-16

## 2018-08-29 PROBLEM — M54.16 RADICULOPATHY, LUMBAR REGION: Chronic | Status: ACTIVE | Noted: 2017-11-16

## 2018-08-29 PROBLEM — M50.20 OTHER CERVICAL DISC DISPLACEMENT, UNSPECIFIED CERVICAL REGION: Chronic | Status: ACTIVE | Noted: 2017-05-17

## 2018-08-29 PROBLEM — G47.33 OBSTRUCTIVE SLEEP APNEA (ADULT) (PEDIATRIC): Chronic | Status: ACTIVE | Noted: 2017-11-16

## 2018-08-29 PROBLEM — M54.2 CERVICALGIA: Chronic | Status: ACTIVE | Noted: 2017-05-17

## 2018-08-29 PROBLEM — M54.30 SCIATICA, UNSPECIFIED SIDE: Chronic | Status: ACTIVE | Noted: 2017-11-16

## 2018-08-29 PROBLEM — I10 ESSENTIAL (PRIMARY) HYPERTENSION: Chronic | Status: ACTIVE | Noted: 2017-11-16

## 2018-08-29 PROBLEM — M54.9 DORSALGIA, UNSPECIFIED: Chronic | Status: ACTIVE | Noted: 2017-05-17

## 2018-08-29 PROBLEM — M54.12 RADICULOPATHY, CERVICAL REGION: Chronic | Status: ACTIVE | Noted: 2017-05-17

## 2018-09-10 ENCOUNTER — APPOINTMENT (OUTPATIENT)
Dept: VASCULAR SURGERY | Facility: CLINIC | Age: 43
End: 2018-09-10

## 2018-09-14 VITALS
SYSTOLIC BLOOD PRESSURE: 134 MMHG | OXYGEN SATURATION: 96 % | DIASTOLIC BLOOD PRESSURE: 91 MMHG | TEMPERATURE: 98 F | HEART RATE: 70 BPM | RESPIRATION RATE: 20 BRPM | WEIGHT: 240.08 LBS | HEIGHT: 74 IN

## 2018-09-14 RX ORDER — INFLUENZA VIRUS VACCINE 15; 15; 15; 15 UG/.5ML; UG/.5ML; UG/.5ML; UG/.5ML
0.5 SUSPENSION INTRAMUSCULAR ONCE
Qty: 0 | Refills: 0 | Status: COMPLETED | OUTPATIENT
Start: 2018-09-17 | End: 2018-09-20

## 2018-09-14 NOTE — PATIENT PROFILE ADULT. - PSH
History of back surgery  removal of cyst in lumbar spine 2012  S/P epidural steroid injection  lumbar spine  S/P lumbar fusion  2006, 2012, 2013 H/O neck surgery    History of back surgery  removal of cyst in lumbar spine 2012  S/P cervical spinal fusion  C4-5 and C5-6, May 2017  S/P epidural steroid injection  lumbar spine  S/P lumbar fusion  2006, 2012, 2013
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
## 2018-09-14 NOTE — H&P ADULT - PROBLEM SELECTOR PLAN 1
Admit to ortho for cervical spine surgery. Medically cleared by Dr. Delgadillo Admit to ortho for cervical spine surgery. Medically cleared by Dr. Delgadillo  Pain Mgt consult

## 2018-09-14 NOTE — H&P ADULT - PSH
H/O neck surgery    History of back surgery  removal of cyst in lumbar spine 2012  S/P cervical spinal fusion  C4-5 and C5-6, May 2017  S/P epidural steroid injection  lumbar spine  S/P lumbar fusion  2006, 2012, 2013

## 2018-09-14 NOTE — PATIENT PROFILE ADULT. - PMH
Back pain    Cervical radiculopathy    Herniated cervical disc    Neck pain    Sleep apnea  CPAP machine Back pain    Cervical radiculopathy    Herniated cervical disc    Hypertension  off medication for the last year  Lumbar nerve root impingement    Neck pain    LISBET on CPAP  severe LISBET  Sciatic leg pain  bilateral

## 2018-09-14 NOTE — H&P ADULT - NSHPPHYSICALEXAM_GEN_ALL_CORE
NAD  MSK:    limited ROM cerical spine secondary to pain    Rest of PE per medical clearance note NAD  MSK: limited ROM cervical spine secondary to pain. AIN/PIN/U nerves intact BUE. Wrist flex/ext intact BUE. Sensation intact BUE but diminished to left compared to right in all nerve distributions except radial (equal BUE at radial n dist). Cap refill brisk. Skin warm and well perfused. Radial pulses palpable BUE.     Rest of PE per medical clearance note

## 2018-09-14 NOTE — H&P ADULT - NSHPLABSRESULTS_GEN_ALL_CORE
preop CXR: NAD,WNL per report  echo 8/2018: EF 55%, LV size and function wnl  PFTS: mild obstructive disease  EKG: wnl per clearance note  preop cbc, coags, bmp, UA WNL

## 2018-09-17 ENCOUNTER — INPATIENT (INPATIENT)
Facility: HOSPITAL | Age: 43
LOS: 2 days | Discharge: HOME | DRG: 455 | End: 2018-09-20
Attending: ORTHOPAEDIC SURGERY | Admitting: ORTHOPAEDIC SURGERY
Payer: COMMERCIAL

## 2018-09-17 DIAGNOSIS — G47.33 OBSTRUCTIVE SLEEP APNEA (ADULT) (PEDIATRIC): ICD-10-CM

## 2018-09-17 DIAGNOSIS — Z98.890 OTHER SPECIFIED POSTPROCEDURAL STATES: Chronic | ICD-10-CM

## 2018-09-17 DIAGNOSIS — Z92.241 PERSONAL HISTORY OF SYSTEMIC STEROID THERAPY: Chronic | ICD-10-CM

## 2018-09-17 DIAGNOSIS — M54.2 CERVICALGIA: ICD-10-CM

## 2018-09-17 DIAGNOSIS — I10 ESSENTIAL (PRIMARY) HYPERTENSION: ICD-10-CM

## 2018-09-17 DIAGNOSIS — Z98.1 ARTHRODESIS STATUS: Chronic | ICD-10-CM

## 2018-09-17 DIAGNOSIS — M54.12 RADICULOPATHY, CERVICAL REGION: ICD-10-CM
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
## 2018-09-17 DIAGNOSIS — Z87.39 PERSONAL HISTORY OF OTHER DISEASES OF THE MUSCULOSKELETAL SYSTEM AND CONNECTIVE TISSUE: ICD-10-CM

## 2018-09-17 DIAGNOSIS — Z96.9 PRESENCE OF FUNCTIONAL IMPLANT, UNSPECIFIED: ICD-10-CM

## 2018-09-17 DIAGNOSIS — Z98.890 OTHER SPECIFIED POSTPROCEDURAL STATES: ICD-10-CM

## 2018-09-17 DIAGNOSIS — M54.32 SCIATICA, LEFT SIDE: ICD-10-CM

## 2018-09-17 DIAGNOSIS — Z99.89 DEPENDENCE ON OTHER ENABLING MACHINES AND DEVICES: ICD-10-CM

## 2018-09-17 DIAGNOSIS — Z87.891 PERSONAL HISTORY OF NICOTINE DEPENDENCE: ICD-10-CM

## 2018-09-17 DIAGNOSIS — M54.31 SCIATICA, RIGHT SIDE: ICD-10-CM
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
## 2018-09-17 DIAGNOSIS — M96.0 PSEUDARTHROSIS AFTER FUSION OR ARTHRODESIS: ICD-10-CM

## 2018-09-17 LAB
BASE EXCESS BLDA CALC-SCNC: -0.5 MMOL/L — SIGNIFICANT CHANGE UP (ref -2–3)
CA-I BLDA-SCNC: 1.04 MMOL/L — LOW (ref 1.12–1.3)
COHGB MFR BLDA: 1 % — SIGNIFICANT CHANGE UP
GAS PNL BLDA: SIGNIFICANT CHANGE UP
GLUCOSE BLDC GLUCOMTR-MCNC: 124 MG/DL — HIGH (ref 70–99)
GLUCOSE BLDC GLUCOMTR-MCNC: 144 MG/DL — HIGH (ref 70–99)
HCO3 BLDA-SCNC: 23 MMOL/L — SIGNIFICANT CHANGE UP (ref 21–28)
HGB BLDA-MCNC: 16 G/DL — SIGNIFICANT CHANGE UP (ref 13–17)
METHGB MFR BLDA: 0.6 % — SIGNIFICANT CHANGE UP
O2 CT VFR BLDA CALC: 22.9 ML/DL — SIGNIFICANT CHANGE UP (ref 15–23)
OXYHGB MFR BLDA: 98 % — SIGNIFICANT CHANGE UP (ref 94–100)
PCO2 BLDA: 33 MMHG — LOW (ref 35–48)
PH BLDA: 7.45 — SIGNIFICANT CHANGE UP (ref 7.35–7.45)
PO2 BLDA: 354 MMHG — HIGH (ref 83–108)
POTASSIUM BLDA-SCNC: 3.7 MMOL/L — SIGNIFICANT CHANGE UP (ref 3.5–4.9)
SAO2 % BLDA: 100 % — SIGNIFICANT CHANGE UP (ref 95–100)
SODIUM BLDA-SCNC: 138 MMOL/L — SIGNIFICANT CHANGE UP (ref 138–146)

## 2018-09-17 RX ORDER — METOCLOPRAMIDE HCL 10 MG
10 TABLET ORAL ONCE
Qty: 0 | Refills: 0 | Status: DISCONTINUED | OUTPATIENT
Start: 2018-09-17 | End: 2018-09-20

## 2018-09-17 RX ORDER — CETIRIZINE HYDROCHLORIDE 10 MG/1
1 TABLET ORAL
Qty: 0 | Refills: 0 | COMMUNITY

## 2018-09-17 RX ORDER — GABAPENTIN 400 MG/1
300 CAPSULE ORAL THREE TIMES A DAY
Qty: 0 | Refills: 0 | Status: DISCONTINUED | OUTPATIENT
Start: 2018-09-17 | End: 2018-09-20

## 2018-09-17 RX ORDER — SODIUM CHLORIDE 9 MG/ML
1000 INJECTION, SOLUTION INTRAVENOUS
Qty: 0 | Refills: 0 | Status: DISCONTINUED | OUTPATIENT
Start: 2018-09-17 | End: 2018-09-18

## 2018-09-17 RX ORDER — HYDROMORPHONE HYDROCHLORIDE 2 MG/ML
30 INJECTION INTRAMUSCULAR; INTRAVENOUS; SUBCUTANEOUS
Qty: 0 | Refills: 0 | Status: DISCONTINUED | OUTPATIENT
Start: 2018-09-17 | End: 2018-09-17

## 2018-09-17 RX ORDER — ACETAMINOPHEN 500 MG
2 TABLET ORAL
Qty: 0 | Refills: 0 | COMMUNITY

## 2018-09-17 RX ORDER — HYDROMORPHONE HYDROCHLORIDE 2 MG/ML
30 INJECTION INTRAMUSCULAR; INTRAVENOUS; SUBCUTANEOUS
Qty: 0 | Refills: 0 | Status: DISCONTINUED | OUTPATIENT
Start: 2018-09-17 | End: 2018-09-19

## 2018-09-17 RX ORDER — ONDANSETRON 8 MG/1
4 TABLET, FILM COATED ORAL EVERY 6 HOURS
Qty: 0 | Refills: 0 | Status: DISCONTINUED | OUTPATIENT
Start: 2018-09-17 | End: 2018-09-20

## 2018-09-17 RX ORDER — DOCUSATE SODIUM 100 MG
100 CAPSULE ORAL THREE TIMES A DAY
Qty: 0 | Refills: 0 | Status: DISCONTINUED | OUTPATIENT
Start: 2018-09-17 | End: 2018-09-20

## 2018-09-17 RX ORDER — OXYCODONE HYDROCHLORIDE 5 MG/1
5 TABLET ORAL
Qty: 0 | Refills: 0 | Status: DISCONTINUED | OUTPATIENT
Start: 2018-09-17 | End: 2018-09-18

## 2018-09-17 RX ORDER — LORATADINE 10 MG/1
10 TABLET ORAL DAILY
Qty: 0 | Refills: 0 | Status: DISCONTINUED | OUTPATIENT
Start: 2018-09-17 | End: 2018-09-20

## 2018-09-17 RX ORDER — CEFAZOLIN SODIUM 1 G
2000 VIAL (EA) INJECTION EVERY 8 HOURS
Qty: 0 | Refills: 0 | Status: DISCONTINUED | OUTPATIENT
Start: 2018-09-17 | End: 2018-09-17

## 2018-09-17 RX ORDER — GABAPENTIN 400 MG/1
0 CAPSULE ORAL
Qty: 0 | Refills: 0 | COMMUNITY

## 2018-09-17 RX ORDER — ACETAMINOPHEN 500 MG
650 TABLET ORAL EVERY 6 HOURS
Qty: 0 | Refills: 0 | Status: DISCONTINUED | OUTPATIENT
Start: 2018-09-17 | End: 2018-09-20

## 2018-09-17 RX ORDER — SENNA PLUS 8.6 MG/1
2 TABLET ORAL AT BEDTIME
Qty: 0 | Refills: 0 | Status: DISCONTINUED | OUTPATIENT
Start: 2018-09-17 | End: 2018-09-20

## 2018-09-17 RX ORDER — HYDROMORPHONE HYDROCHLORIDE 2 MG/ML
0.5 INJECTION INTRAMUSCULAR; INTRAVENOUS; SUBCUTANEOUS
Qty: 0 | Refills: 0 | Status: DISCONTINUED | OUTPATIENT
Start: 2018-09-17 | End: 2018-09-17

## 2018-09-17 RX ORDER — OXYCODONE HYDROCHLORIDE 5 MG/1
10 TABLET ORAL
Qty: 0 | Refills: 0 | Status: DISCONTINUED | OUTPATIENT
Start: 2018-09-17 | End: 2018-09-18

## 2018-09-17 RX ORDER — TIZANIDINE 4 MG/1
1 TABLET ORAL
Qty: 0 | Refills: 0 | COMMUNITY

## 2018-09-17 RX ORDER — CEFAZOLIN SODIUM 1 G
2000 VIAL (EA) INJECTION EVERY 8 HOURS
Qty: 0 | Refills: 0 | Status: COMPLETED | OUTPATIENT
Start: 2018-09-17 | End: 2018-09-18

## 2018-09-17 RX ORDER — MAGNESIUM HYDROXIDE 400 MG/1
30 TABLET, CHEWABLE ORAL EVERY 12 HOURS
Qty: 0 | Refills: 0 | Status: DISCONTINUED | OUTPATIENT
Start: 2018-09-17 | End: 2018-09-20

## 2018-09-17 RX ORDER — LIDOCAINE 4 G/100G
1 CREAM TOPICAL DAILY
Qty: 0 | Refills: 0 | Status: DISCONTINUED | OUTPATIENT
Start: 2018-09-17 | End: 2018-09-18

## 2018-09-17 RX ADMIN — HYDROMORPHONE HYDROCHLORIDE 0.5 MILLIGRAM(S): 2 INJECTION INTRAMUSCULAR; INTRAVENOUS; SUBCUTANEOUS at 20:10

## 2018-09-17 RX ADMIN — Medication 2000 MILLIGRAM(S): at 22:40

## 2018-09-17 RX ADMIN — HYDROMORPHONE HYDROCHLORIDE 0.5 MILLIGRAM(S): 2 INJECTION INTRAMUSCULAR; INTRAVENOUS; SUBCUTANEOUS at 19:36

## 2018-09-17 RX ADMIN — HYDROMORPHONE HYDROCHLORIDE 30 MILLILITER(S): 2 INJECTION INTRAMUSCULAR; INTRAVENOUS; SUBCUTANEOUS at 20:22

## 2018-09-17 RX ADMIN — HYDROMORPHONE HYDROCHLORIDE 0.5 MILLIGRAM(S): 2 INJECTION INTRAMUSCULAR; INTRAVENOUS; SUBCUTANEOUS at 18:51

## 2018-09-17 RX ADMIN — HYDROMORPHONE HYDROCHLORIDE 0.5 MILLIGRAM(S): 2 INJECTION INTRAMUSCULAR; INTRAVENOUS; SUBCUTANEOUS at 19:55

## 2018-09-17 RX ADMIN — LIDOCAINE 1 APPLICATION(S): 4 CREAM TOPICAL at 22:58

## 2018-09-17 RX ADMIN — HYDROMORPHONE HYDROCHLORIDE 0.5 MILLIGRAM(S): 2 INJECTION INTRAMUSCULAR; INTRAVENOUS; SUBCUTANEOUS at 19:20

## 2018-09-17 RX ADMIN — SODIUM CHLORIDE 100 MILLILITER(S): 9 INJECTION, SOLUTION INTRAVENOUS at 18:37

## 2018-09-17 RX ADMIN — HYDROMORPHONE HYDROCHLORIDE 0.5 MILLIGRAM(S): 2 INJECTION INTRAMUSCULAR; INTRAVENOUS; SUBCUTANEOUS at 18:35

## 2018-09-17 RX ADMIN — ONDANSETRON 4 MILLIGRAM(S): 8 TABLET, FILM COATED ORAL at 21:17

## 2018-09-18 ENCOUNTER — RESULT REVIEW (OUTPATIENT)
Age: 43
End: 2018-09-18

## 2018-09-18 ENCOUNTER — TRANSCRIPTION ENCOUNTER (OUTPATIENT)
Age: 43
End: 2018-09-18

## 2018-09-18 LAB
ANION GAP SERPL CALC-SCNC: 13 MMOL/L — SIGNIFICANT CHANGE UP (ref 5–17)
BASOPHILS NFR BLD AUTO: 0.1 % — SIGNIFICANT CHANGE UP (ref 0–2)
BUN SERPL-MCNC: 7 MG/DL — SIGNIFICANT CHANGE UP (ref 7–23)
CALCIUM SERPL-MCNC: 9.3 MG/DL — SIGNIFICANT CHANGE UP (ref 8.4–10.5)
CHLORIDE SERPL-SCNC: 100 MMOL/L — SIGNIFICANT CHANGE UP (ref 96–108)
CO2 SERPL-SCNC: 25 MMOL/L — SIGNIFICANT CHANGE UP (ref 22–31)
CREAT SERPL-MCNC: 0.79 MG/DL — SIGNIFICANT CHANGE UP (ref 0.5–1.3)
EOSINOPHIL NFR BLD AUTO: 0.3 % — SIGNIFICANT CHANGE UP (ref 0–6)
GLUCOSE SERPL-MCNC: 123 MG/DL — HIGH (ref 70–99)
HCT VFR BLD CALC: 45.3 % — SIGNIFICANT CHANGE UP (ref 39–50)
HGB BLD-MCNC: 15.2 G/DL — SIGNIFICANT CHANGE UP (ref 13–17)
LYMPHOCYTES # BLD AUTO: 11.3 % — LOW (ref 13–44)
MCHC RBC-ENTMCNC: 29.7 PG — SIGNIFICANT CHANGE UP (ref 27–34)
MCHC RBC-ENTMCNC: 33.6 G/DL — SIGNIFICANT CHANGE UP (ref 32–36)
MCV RBC AUTO: 88.5 FL — SIGNIFICANT CHANGE UP (ref 80–100)
MONOCYTES NFR BLD AUTO: 9.1 % — SIGNIFICANT CHANGE UP (ref 2–14)
NEUTROPHILS NFR BLD AUTO: 79.2 % — HIGH (ref 43–77)
PLATELET # BLD AUTO: 178 K/UL — SIGNIFICANT CHANGE UP (ref 150–400)
POTASSIUM SERPL-MCNC: 3.7 MMOL/L — SIGNIFICANT CHANGE UP (ref 3.5–5.3)
POTASSIUM SERPL-SCNC: 3.7 MMOL/L — SIGNIFICANT CHANGE UP (ref 3.5–5.3)
RBC # BLD: 5.12 M/UL — SIGNIFICANT CHANGE UP (ref 4.2–5.8)
RBC # FLD: 13.3 % — SIGNIFICANT CHANGE UP (ref 10.3–16.9)
SODIUM SERPL-SCNC: 138 MMOL/L — SIGNIFICANT CHANGE UP (ref 135–145)
WBC # BLD: 14.5 K/UL — HIGH (ref 3.8–10.5)
WBC # FLD AUTO: 14.5 K/UL — HIGH (ref 3.8–10.5)

## 2018-09-18 RX ORDER — PANTOPRAZOLE SODIUM 20 MG/1
40 TABLET, DELAYED RELEASE ORAL
Qty: 0 | Refills: 0 | Status: DISCONTINUED | OUTPATIENT
Start: 2018-09-18 | End: 2018-09-20

## 2018-09-18 RX ORDER — HYDROMORPHONE HYDROCHLORIDE 2 MG/ML
0.5 INJECTION INTRAMUSCULAR; INTRAVENOUS; SUBCUTANEOUS
Qty: 0 | Refills: 0 | Status: DISCONTINUED | OUTPATIENT
Start: 2018-09-18 | End: 2018-09-18

## 2018-09-18 RX ORDER — KETOROLAC TROMETHAMINE 30 MG/ML
30 SYRINGE (ML) INJECTION EVERY 6 HOURS
Qty: 0 | Refills: 0 | Status: DISCONTINUED | OUTPATIENT
Start: 2018-09-18 | End: 2018-09-20

## 2018-09-18 RX ORDER — HYDROMORPHONE HYDROCHLORIDE 2 MG/ML
0.5 INJECTION INTRAMUSCULAR; INTRAVENOUS; SUBCUTANEOUS
Qty: 0 | Refills: 0 | Status: DISCONTINUED | OUTPATIENT
Start: 2018-09-18 | End: 2018-09-19

## 2018-09-18 RX ORDER — ACETAMINOPHEN 500 MG
975 TABLET ORAL EVERY 8 HOURS
Qty: 0 | Refills: 0 | Status: DISCONTINUED | OUTPATIENT
Start: 2018-09-18 | End: 2018-09-20

## 2018-09-18 RX ORDER — ACETAMINOPHEN 500 MG
1000 TABLET ORAL ONCE
Qty: 0 | Refills: 0 | Status: COMPLETED | OUTPATIENT
Start: 2018-09-18 | End: 2018-09-18

## 2018-09-18 RX ORDER — METOCLOPRAMIDE HCL 10 MG
10 TABLET ORAL EVERY 8 HOURS
Qty: 0 | Refills: 0 | Status: DISCONTINUED | OUTPATIENT
Start: 2018-09-18 | End: 2018-09-20

## 2018-09-18 RX ORDER — SODIUM CHLORIDE 9 MG/ML
1000 INJECTION INTRAMUSCULAR; INTRAVENOUS; SUBCUTANEOUS
Qty: 0 | Refills: 0 | Status: DISCONTINUED | OUTPATIENT
Start: 2018-09-18 | End: 2018-09-20

## 2018-09-18 RX ORDER — CYCLOBENZAPRINE HYDROCHLORIDE 10 MG/1
10 TABLET, FILM COATED ORAL THREE TIMES A DAY
Qty: 0 | Refills: 0 | Status: DISCONTINUED | OUTPATIENT
Start: 2018-09-18 | End: 2018-09-20

## 2018-09-18 RX ADMIN — Medication 30 MILLIGRAM(S): at 11:04

## 2018-09-18 RX ADMIN — SODIUM CHLORIDE 100 MILLILITER(S): 9 INJECTION, SOLUTION INTRAVENOUS at 14:55

## 2018-09-18 RX ADMIN — Medication 30 MILLILITER(S): at 15:57

## 2018-09-18 RX ADMIN — CYCLOBENZAPRINE HYDROCHLORIDE 10 MILLIGRAM(S): 10 TABLET, FILM COATED ORAL at 09:31

## 2018-09-18 RX ADMIN — Medication 1000 MILLIGRAM(S): at 11:04

## 2018-09-18 RX ADMIN — Medication 2000 MILLIGRAM(S): at 05:55

## 2018-09-18 RX ADMIN — Medication 30 MILLIGRAM(S): at 21:35

## 2018-09-18 RX ADMIN — GABAPENTIN 300 MILLIGRAM(S): 400 CAPSULE ORAL at 21:35

## 2018-09-18 RX ADMIN — GABAPENTIN 300 MILLIGRAM(S): 400 CAPSULE ORAL at 14:54

## 2018-09-18 RX ADMIN — GABAPENTIN 300 MILLIGRAM(S): 400 CAPSULE ORAL at 05:55

## 2018-09-18 RX ADMIN — Medication 400 MILLIGRAM(S): at 10:49

## 2018-09-18 RX ADMIN — LORATADINE 10 MILLIGRAM(S): 10 TABLET ORAL at 10:49

## 2018-09-18 RX ADMIN — Medication 100 MILLIGRAM(S): at 05:56

## 2018-09-18 RX ADMIN — Medication 30 MILLIGRAM(S): at 10:49

## 2018-09-18 RX ADMIN — Medication 975 MILLIGRAM(S): at 14:55

## 2018-09-18 RX ADMIN — HYDROMORPHONE HYDROCHLORIDE 0.5 MILLIGRAM(S): 2 INJECTION INTRAMUSCULAR; INTRAVENOUS; SUBCUTANEOUS at 08:40

## 2018-09-18 RX ADMIN — Medication 30 MILLIGRAM(S): at 16:47

## 2018-09-18 RX ADMIN — Medication 975 MILLIGRAM(S): at 22:40

## 2018-09-18 RX ADMIN — HYDROMORPHONE HYDROCHLORIDE 0.5 MILLIGRAM(S): 2 INJECTION INTRAMUSCULAR; INTRAVENOUS; SUBCUTANEOUS at 05:59

## 2018-09-18 RX ADMIN — Medication 100 MILLIGRAM(S): at 21:35

## 2018-09-18 RX ADMIN — SENNA PLUS 2 TABLET(S): 8.6 TABLET ORAL at 21:35

## 2018-09-18 RX ADMIN — Medication 975 MILLIGRAM(S): at 15:50

## 2018-09-18 RX ADMIN — Medication 100 MILLIGRAM(S): at 14:54

## 2018-09-18 RX ADMIN — Medication 30 MILLIGRAM(S): at 22:43

## 2018-09-18 RX ADMIN — Medication 30 MILLIGRAM(S): at 17:02

## 2018-09-18 RX ADMIN — SODIUM CHLORIDE 75 MILLILITER(S): 9 INJECTION INTRAMUSCULAR; INTRAVENOUS; SUBCUTANEOUS at 15:57

## 2018-09-18 RX ADMIN — Medication 975 MILLIGRAM(S): at 21:35

## 2018-09-18 RX ADMIN — HYDROMORPHONE HYDROCHLORIDE 30 MILLILITER(S): 2 INJECTION INTRAMUSCULAR; INTRAVENOUS; SUBCUTANEOUS at 18:43

## 2018-09-18 RX ADMIN — ONDANSETRON 4 MILLIGRAM(S): 8 TABLET, FILM COATED ORAL at 05:56

## 2018-09-18 NOTE — DISCHARGE NOTE ADULT - ADDITIONAL INSTRUCTIONS
No strenuous activity (bending/twisting), heavy lifting, driving or returning to work until cleared by MD.    Change dressing daily with gauze/tape or medipore dressing until post-op day #5, then leave incision open to air.  You may shower post-op day#5, keep incision clean and dry.     Try to have regular bowel movements, take stool softener or laxative if necessary.  May take pepcid or zantac for upset stomach.  May apply ice to affected areas to decrease swelling.    Call to schedule an appointment with Dr. Gonzalez for follow up, if you have staples or sutures they will be removed in office.    Contact your doctor if you experience: fever greater than 101.5, chills, chest pain, difficulty breathing, redness or excessive drainage around the incision, other concerns.    Follow up with your primary care provider. No strenuous activity (bending/twisting), heavy lifting, driving or returning to work until cleared by MD.    You have a prevena dressing in place. After approximately 7 days, the battery will shut down. At that time, you may remove the dressing and discard it. **You may also remove the prevena if it makes a constant noise.  You may shower, however please avoid direct water contact on dressing and keep battery pack completely dry. Absolutely no soaking in bathtubs. After dressing is removed, leave incision open to air. Keep incision clean and dry.    Please change the dressing on the front of your neck and on the side of your abdomen daily with gauze/tape or medipore dressing until post-op day #5, then leave incision open to air. You may shower, keep incision clean and dry.     Please follow up with Dr. Haines next week for staple removal.     Try to have regular bowel movements, take stool softener or laxative if necessary.  May take pepcid or zantac for upset stomach.  May apply ice to affected areas to decrease swelling.    Call to schedule an appointment with Dr. Gonzalez for follow up, if you have staples or sutures they will be removed in office.    Contact your doctor if you experience: fever greater than 101.5, chills, chest pain, difficulty breathing, redness or excessive drainage around the incision, other concerns.    Follow up with your Primary Care Provider.

## 2018-09-18 NOTE — DISCHARGE NOTE ADULT - CARE PLAN
Principal Discharge DX:	Cervical radiculopathy  Goal:	Improvement  Assessment and plan of treatment:	See below

## 2018-09-18 NOTE — CONSULT NOTE ADULT - SUBJECTIVE AND OBJECTIVE BOX
Pain Management Consult Note - [facility] [phone]    Chief Complaint: Neck pain    HPI: patient seen and examined today, patient sitting up in bed, in no apparent distress. Patient complains of neck and surgical site pain but expresses some pain relief with Dilaudid PCA. Patient continues to complain       Pain is ___ sharp __x__dull ___burning __x_achy ___ Intensity: ____ mild __x_mod __x_severe     Location __x__surgical site __x__cervical _____lumbar ____abd ____upper ext____lower ext    Worse with _x___activity __x__movement _____physical therapy___ Rest    Improved with __x__medication __x__rest ____physical therapy    ROS: Const:  __-_febrile   Eyes:___ENT:___CV: _-__chest pain  Resp: __-__sob  GI:__-_nausea __-_vomiting _-__abd pain ___npo ___clears _x_full diet __bm  :___ Musk: _x__pain _x__spasm  Skin:___ Neuro:  _-__rsaldwsd__-_fsesnjufi___ numbness __x_weakness _-__paresth  Psych:_-_anxiety  Endo:___ Heme:___Allergy:_________, __x_all others reviewed and negative    PAST MEDICAL & SURGICAL HISTORY:  Sciatic leg pain: bilateral  Lumbar nerve root impingement  Hypertension: off medication for the last year  LISBET on CPAP: severe LISBET  Herniated cervical disc  Back pain  Cervical radiculopathy  Neck pain  H/O neck surgery  S/P cervical spinal fusion: C4-5 and C5-6, May 2017  S/P epidural steroid injection: lumbar spine  History of back surgery: removal of cyst in lumbar spine 2012  S/P lumbar fusion: 2006, 2012, 2013  Family history of thyroid disease (Mother)  Family history of ovarian cancer (Mother)  Handoff  MEWS Score  Sciatic leg pain  Lumbar nerve root impingement  Hypertension  LISBET on CPAP  Sleep apnea  Herniated cervical disc  Back pain  Cervical radiculopathy  Neck pain  LISBET on CPAP  Hypertension  Cervical radiculopathy  H/O neck surgery  S/P cervical spinal fusion  S/P epidural steroid injection  History of back surgery  S/P lumbar fusion      SH: _-__Tobacco   _-__Alcohol                          FH:FAMILY HISTORY:  Family history of thyroid disease (Mother)  Family history of ovarian cancer (Mother)      acetaminophen   Tablet .. 650 milliGRAM(s) Oral every 6 hours PRN  acetaminophen   Tablet .. 975 milliGRAM(s) Oral every 8 hours  cyclobenzaprine 10 milliGRAM(s) Oral three times a day PRN  docusate sodium 100 milliGRAM(s) Oral three times a day  gabapentin 300 milliGRAM(s) Oral three times a day  HYDROmorphone PCA (1 mG/mL) 30 milliLiter(s) PCA Continuous PCA Continuous  HYDROmorphone PCA (1 mG/mL) Rescue Clinician Bolus 0.5 milliGRAM(s) IV Push every 2 hours PRN  influenza   Vaccine 0.5 milliLiter(s) IntraMuscular once  ketorolac   Injectable 30 milliGRAM(s) IV Push every 6 hours  lactated ringers. 1000 milliLiter(s) IV Continuous <Continuous>  lidocaine 2% Gel 1 Application(s) Topical daily  loratadine 10 milliGRAM(s) Oral daily  magnesium hydroxide Suspension 30 milliLiter(s) Oral every 12 hours PRN  metoclopramide Injectable 10 milliGRAM(s) IV Push once PRN  metoclopramide Injectable 10 milliGRAM(s) IV Push every 8 hours PRN  ondansetron Injectable 4 milliGRAM(s) IV Push every 6 hours PRN  senna 2 Tablet(s) Oral at bedtime      T(C): 36.2 (09-18-18 @ 08:30), Max: 36.4 (09-18-18 @ 04:55)  HR: 78 (09-18-18 @ 11:00) (76 - 102)  BP: 111/92 (09-18-18 @ 11:00) (95/74 - 155/78)  RR: 16 (09-18-18 @ 11:00) (12 - 18)  SpO2: 95% (09-18-18 @ 11:00) (93% - 98%)  Wt(kg): --    T(C): 36.2 (09-18-18 @ 08:30), Max: 36.4 (09-18-18 @ 04:55)  HR: 78 (09-18-18 @ 11:00) (76 - 102)  BP: 111/92 (09-18-18 @ 11:00) (95/74 - 155/78)  RR: 16 (09-18-18 @ 11:00) (12 - 18)  SpO2: 95% (09-18-18 @ 11:00) (93% - 98%)  Wt(kg): --    T(C): 36.2 (09-18-18 @ 08:30), Max: 36.4 (09-18-18 @ 04:55)  HR: 78 (09-18-18 @ 11:00) (76 - 102)  BP: 111/92 (09-18-18 @ 11:00) (95/74 - 155/78)  RR: 16 (09-18-18 @ 11:00) (12 - 18)  SpO2: 95% (09-18-18 @ 11:00) (93% - 98%)  Wt(kg): --    PHYSICAL EXAM:  Gen Appearance: _x__no acute distress _x__appropriate        Neuro: _x__SILT feet____ EOM Intact Psych: AAOX_3_, __x_mood/affect appropriate        Eyes: _x__conjunctiva WNL  ___x__ Pupils equal and round        ENT: _x__ears and nose atraumatic_x__ Hearing grossly intact        Neck: ___trachea midline, no visible masses ___thyroid without palpable mass    Resp: _x__Nml WOB____No tactile fremitus ___clear to auscultation    Cardio: _x__extremities free from edema _x___pedal pulses palpable    GI/Abdomen: x___soft ___x__ Nontender___x___Nondistended_____HSM    Lymphatic: ___no palpable nodes in neck  __x_no palpable nodes calves and feet    Skin/Wound: _x__Incision, _x__Dressing c/d/i,   __x__surrounding tissues soft,  _x__drain/chest tube present____    Muscular: EHL __5_/5  Gastrocnemius__5_/5    ___absent clubbing/cyanosis      ASSESSMENT: This is a 42y old Male with a history of lumbar nerve root impingement, sciatic leg pain, s/p  ACDF, PCF C5-C7, post op day 1, pain not well controlled with current pain medication regimen.         Recommended Treatment PLAN:  1. Dilaudid PCA 0.2mg, Q6 minutes, 9mg 4hour max    2. Dilaudid 0.5mg Q2h IVP prn breakthrough pain   3. Flexeril 10mg Po Q8h prn muscle spasms  4. acetaminophen 1000mg x1 dose, tylenol 975mg Po Q8 standing  5. ketoroloc 15mg IV Q6h, x4 doses  Plan discussed with Guille De La Rosa

## 2018-09-18 NOTE — PROVIDER CONTACT NOTE (OTHER) - ASSESSMENT
Pt stated pca dilaudid has not been relieving his pain. Pt has been pressing the pca button frequently.

## 2018-09-18 NOTE — DISCHARGE NOTE ADULT - MEDICATION SUMMARY - MEDICATIONS TO STOP TAKING
I will STOP taking the medications listed below when I get home from the hospital:    traMADol 50 mg oral tablet  -- 1 tab(s) by mouth every 4 hours

## 2018-09-18 NOTE — DISCHARGE NOTE ADULT - MEDICATION SUMMARY - MEDICATIONS TO TAKE
I will START or STAY ON the medications listed below when I get home from the hospital:    Tylenol 325 mg oral tablet  -- 2 tab(s) by mouth every 6 hours, As Needed for mild pain (1-3) or fever (temperature greater than 100.4F). Max daily is 3000mg.   -- Indication: For Mild pain or fever    oxyCODONE 5 mg oral tablet  -- 1 tab po q4h prn moderate pain (4-6) or 2 tabs po q4h prn severe pain (7-10). MDD:7 tabs  -- Indication: For Moderate to severe pain    gabapentin 300 mg oral tablet  -- orally 3 times a day  -- Indication: For Home med - pain    ZyrTEC 10 mg oral tablet  -- 1 tab(s) by mouth once a day  -- Indication: For Seasonal allergies    docusate sodium 100 mg oral capsule  -- 1 cap(s) by mouth 3 times a day  -- Indication: For Constipation    senna oral tablet  -- 2 tab(s) by mouth once a day (at bedtime)  -- Indication: For Constipation    tiZANidine 2 mg oral tablet  -- 1 tab(s) by mouth 3 times a day  -- Indication: For Muscle spasms    Depo-Testosterone 200 mg/mL intramuscular solution  -- 110 milligram(s) intramuscular once a week  -- Indication: For Home hormone

## 2018-09-18 NOTE — DISCHARGE NOTE ADULT - CARE PROVIDER_API CALL
Jae Gonzalez (MD), Orthopaedic Surgery  [address]  [address]  [place], [place] [address]  Phone: [phone]  Fax: [phone]

## 2018-09-18 NOTE — DISCHARGE NOTE ADULT - VISION (WITH CORRECTIVE LENSES IF THE PATIENT USUALLY WEARS THEM):
Partially impaired: cannot see medication labels or newsprint, but can see obstacles in path, and the surrounding layout; can count fingers at arm's length Partially impaired: cannot see medication labels or newsprint, but can see obstacles in path, and the surrounding layout; can count fingers at arm's length/wears glasses

## 2018-09-18 NOTE — DISCHARGE NOTE ADULT - HOSPITAL COURSE
Admitted  Surgery - underwent ACDF, PCF C5-C7  [place]-op Antibiotics  Pain control  DVT prophylaxis  OOB/Physical Therapy  Consult: Dr. Rousseau (Neuro), Pain Mgmt

## 2018-09-19 LAB
ANION GAP SERPL CALC-SCNC: 11 MMOL/L — SIGNIFICANT CHANGE UP (ref 5–17)
BASOPHILS NFR BLD AUTO: 0.3 % — SIGNIFICANT CHANGE UP (ref 0–2)
BUN SERPL-MCNC: 7 MG/DL — SIGNIFICANT CHANGE UP (ref 7–23)
CALCIUM SERPL-MCNC: 9 MG/DL — SIGNIFICANT CHANGE UP (ref 8.4–10.5)
CHLORIDE SERPL-SCNC: 103 MMOL/L — SIGNIFICANT CHANGE UP (ref 96–108)
CO2 SERPL-SCNC: 26 MMOL/L — SIGNIFICANT CHANGE UP (ref 22–31)
CREAT SERPL-MCNC: 0.85 MG/DL — SIGNIFICANT CHANGE UP (ref 0.5–1.3)
EOSINOPHIL NFR BLD AUTO: 1 % — SIGNIFICANT CHANGE UP (ref 0–6)
GLUCOSE SERPL-MCNC: 102 MG/DL — HIGH (ref 70–99)
HCT VFR BLD CALC: 43.9 % — SIGNIFICANT CHANGE UP (ref 39–50)
HGB BLD-MCNC: 14.5 G/DL — SIGNIFICANT CHANGE UP (ref 13–17)
LYMPHOCYTES # BLD AUTO: 21.8 % — SIGNIFICANT CHANGE UP (ref 13–44)
MCHC RBC-ENTMCNC: 30.2 PG — SIGNIFICANT CHANGE UP (ref 27–34)
MCHC RBC-ENTMCNC: 33 G/DL — SIGNIFICANT CHANGE UP (ref 32–36)
MCV RBC AUTO: 91.5 FL — SIGNIFICANT CHANGE UP (ref 80–100)
MONOCYTES NFR BLD AUTO: 10.9 % — SIGNIFICANT CHANGE UP (ref 2–14)
NEUTROPHILS NFR BLD AUTO: 66 % — SIGNIFICANT CHANGE UP (ref 43–77)
PLATELET # BLD AUTO: 164 K/UL — SIGNIFICANT CHANGE UP (ref 150–400)
POTASSIUM SERPL-MCNC: 3.9 MMOL/L — SIGNIFICANT CHANGE UP (ref 3.5–5.3)
POTASSIUM SERPL-SCNC: 3.9 MMOL/L — SIGNIFICANT CHANGE UP (ref 3.5–5.3)
RBC # BLD: 4.8 M/UL — SIGNIFICANT CHANGE UP (ref 4.2–5.8)
RBC # FLD: 13.6 % — SIGNIFICANT CHANGE UP (ref 10.3–16.9)
SODIUM SERPL-SCNC: 140 MMOL/L — SIGNIFICANT CHANGE UP (ref 135–145)
WBC # BLD: 10.4 K/UL — SIGNIFICANT CHANGE UP (ref 3.8–10.5)
WBC # FLD AUTO: 10.4 K/UL — SIGNIFICANT CHANGE UP (ref 3.8–10.5)

## 2018-09-19 RX ORDER — OXYCODONE HYDROCHLORIDE 5 MG/1
5 TABLET ORAL EVERY 4 HOURS
Qty: 0 | Refills: 0 | Status: DISCONTINUED | OUTPATIENT
Start: 2018-09-19 | End: 2018-09-20

## 2018-09-19 RX ORDER — HYDROMORPHONE HYDROCHLORIDE 2 MG/ML
0.5 INJECTION INTRAMUSCULAR; INTRAVENOUS; SUBCUTANEOUS
Qty: 0 | Refills: 0 | Status: DISCONTINUED | OUTPATIENT
Start: 2018-09-19 | End: 2018-09-20

## 2018-09-19 RX ORDER — IBUPROFEN 200 MG
600 TABLET ORAL EVERY 6 HOURS
Qty: 0 | Refills: 0 | Status: DISCONTINUED | OUTPATIENT
Start: 2018-09-19 | End: 2018-09-20

## 2018-09-19 RX ORDER — OXYCODONE HYDROCHLORIDE 5 MG/1
10 TABLET ORAL EVERY 4 HOURS
Qty: 0 | Refills: 0 | Status: DISCONTINUED | OUTPATIENT
Start: 2018-09-19 | End: 2018-09-20

## 2018-09-19 RX ADMIN — PANTOPRAZOLE SODIUM 40 MILLIGRAM(S): 20 TABLET, DELAYED RELEASE ORAL at 05:16

## 2018-09-19 RX ADMIN — Medication 30 MILLIGRAM(S): at 11:40

## 2018-09-19 RX ADMIN — Medication 600 MILLIGRAM(S): at 17:55

## 2018-09-19 RX ADMIN — OXYCODONE HYDROCHLORIDE 10 MILLIGRAM(S): 5 TABLET ORAL at 22:10

## 2018-09-19 RX ADMIN — HYDROMORPHONE HYDROCHLORIDE 0.5 MILLIGRAM(S): 2 INJECTION INTRAMUSCULAR; INTRAVENOUS; SUBCUTANEOUS at 16:01

## 2018-09-19 RX ADMIN — Medication 30 MILLIGRAM(S): at 17:55

## 2018-09-19 RX ADMIN — LORATADINE 10 MILLIGRAM(S): 10 TABLET ORAL at 11:48

## 2018-09-19 RX ADMIN — Medication 30 MILLIGRAM(S): at 18:00

## 2018-09-19 RX ADMIN — Medication 975 MILLIGRAM(S): at 22:10

## 2018-09-19 RX ADMIN — Medication 100 MILLIGRAM(S): at 05:12

## 2018-09-19 RX ADMIN — Medication 30 MILLIGRAM(S): at 06:33

## 2018-09-19 RX ADMIN — GABAPENTIN 300 MILLIGRAM(S): 400 CAPSULE ORAL at 05:12

## 2018-09-19 RX ADMIN — HYDROMORPHONE HYDROCHLORIDE 0.5 MILLIGRAM(S): 2 INJECTION INTRAMUSCULAR; INTRAVENOUS; SUBCUTANEOUS at 16:30

## 2018-09-19 RX ADMIN — GABAPENTIN 300 MILLIGRAM(S): 400 CAPSULE ORAL at 14:30

## 2018-09-19 RX ADMIN — Medication 975 MILLIGRAM(S): at 14:31

## 2018-09-19 RX ADMIN — OXYCODONE HYDROCHLORIDE 10 MILLIGRAM(S): 5 TABLET ORAL at 21:13

## 2018-09-19 RX ADMIN — GABAPENTIN 300 MILLIGRAM(S): 400 CAPSULE ORAL at 21:13

## 2018-09-19 RX ADMIN — OXYCODONE HYDROCHLORIDE 10 MILLIGRAM(S): 5 TABLET ORAL at 14:31

## 2018-09-19 RX ADMIN — SENNA PLUS 2 TABLET(S): 8.6 TABLET ORAL at 21:14

## 2018-09-19 RX ADMIN — Medication 100 MILLIGRAM(S): at 14:30

## 2018-09-19 RX ADMIN — Medication 975 MILLIGRAM(S): at 21:14

## 2018-09-19 RX ADMIN — Medication 100 MILLIGRAM(S): at 21:13

## 2018-09-19 RX ADMIN — Medication 30 MILLIGRAM(S): at 05:12

## 2018-09-19 RX ADMIN — Medication 600 MILLIGRAM(S): at 18:00

## 2018-09-19 RX ADMIN — Medication 975 MILLIGRAM(S): at 05:12

## 2018-09-19 RX ADMIN — Medication 975 MILLIGRAM(S): at 06:33

## 2018-09-20 VITALS
OXYGEN SATURATION: 96 % | HEART RATE: 67 BPM | TEMPERATURE: 96 F | RESPIRATION RATE: 16 BRPM | SYSTOLIC BLOOD PRESSURE: 135 MMHG | DIASTOLIC BLOOD PRESSURE: 81 MMHG

## 2018-09-20 PROCEDURE — 90686 IIV4 VACC NO PRSV 0.5 ML IM: CPT

## 2018-09-20 PROCEDURE — 95940 IONM IN OPERATNG ROOM 15 MIN: CPT

## 2018-09-20 PROCEDURE — 85018 HEMOGLOBIN: CPT

## 2018-09-20 PROCEDURE — C1889: CPT

## 2018-09-20 PROCEDURE — 85025 COMPLETE CBC W/AUTO DIFF WBC: CPT

## 2018-09-20 PROCEDURE — 84132 ASSAY OF SERUM POTASSIUM: CPT

## 2018-09-20 PROCEDURE — 84295 ASSAY OF SERUM SODIUM: CPT

## 2018-09-20 PROCEDURE — 88300 SURGICAL PATH GROSS: CPT

## 2018-09-20 PROCEDURE — 36415 COLL VENOUS BLD VENIPUNCTURE: CPT

## 2018-09-20 PROCEDURE — C1713: CPT

## 2018-09-20 PROCEDURE — 82962 GLUCOSE BLOOD TEST: CPT

## 2018-09-20 PROCEDURE — 76000 FLUOROSCOPY <1 HR PHYS/QHP: CPT

## 2018-09-20 PROCEDURE — 82330 ASSAY OF CALCIUM: CPT

## 2018-09-20 PROCEDURE — 80048 BASIC METABOLIC PNL TOTAL CA: CPT

## 2018-09-20 RX ORDER — SENNA PLUS 8.6 MG/1
2 TABLET ORAL
Qty: 0 | Refills: 0 | COMMUNITY
Start: 2018-09-20

## 2018-09-20 RX ORDER — DOCUSATE SODIUM 100 MG
1 CAPSULE ORAL
Qty: 0 | Refills: 0 | COMMUNITY
Start: 2018-09-20

## 2018-09-20 RX ORDER — ACETAMINOPHEN 500 MG
2 TABLET ORAL
Qty: 0 | Refills: 0 | COMMUNITY

## 2018-09-20 RX ORDER — OXYCODONE HYDROCHLORIDE 5 MG/1
1 TABLET ORAL
Qty: 35 | Refills: 0 | OUTPATIENT
Start: 2018-09-20 | End: 2018-09-24

## 2018-09-20 RX ORDER — TRAMADOL HYDROCHLORIDE 50 MG/1
1 TABLET ORAL
Qty: 0 | Refills: 0 | COMMUNITY

## 2018-09-20 RX ADMIN — Medication 30 MILLIGRAM(S): at 07:00

## 2018-09-20 RX ADMIN — Medication 600 MILLIGRAM(S): at 01:40

## 2018-09-20 RX ADMIN — OXYCODONE HYDROCHLORIDE 10 MILLIGRAM(S): 5 TABLET ORAL at 05:30

## 2018-09-20 RX ADMIN — HYDROMORPHONE HYDROCHLORIDE 0.5 MILLIGRAM(S): 2 INJECTION INTRAMUSCULAR; INTRAVENOUS; SUBCUTANEOUS at 07:40

## 2018-09-20 RX ADMIN — MAGNESIUM HYDROXIDE 30 MILLILITER(S): 400 TABLET, CHEWABLE ORAL at 06:04

## 2018-09-20 RX ADMIN — Medication 975 MILLIGRAM(S): at 07:18

## 2018-09-20 RX ADMIN — Medication 100 MILLIGRAM(S): at 06:04

## 2018-09-20 RX ADMIN — Medication 600 MILLIGRAM(S): at 00:11

## 2018-09-20 RX ADMIN — OXYCODONE HYDROCHLORIDE 10 MILLIGRAM(S): 5 TABLET ORAL at 04:37

## 2018-09-20 RX ADMIN — OXYCODONE HYDROCHLORIDE 10 MILLIGRAM(S): 5 TABLET ORAL at 10:05

## 2018-09-20 RX ADMIN — OXYCODONE HYDROCHLORIDE 10 MILLIGRAM(S): 5 TABLET ORAL at 09:05

## 2018-09-20 RX ADMIN — HYDROMORPHONE HYDROCHLORIDE 0.5 MILLIGRAM(S): 2 INJECTION INTRAMUSCULAR; INTRAVENOUS; SUBCUTANEOUS at 07:18

## 2018-09-20 RX ADMIN — Medication 975 MILLIGRAM(S): at 06:31

## 2018-09-20 RX ADMIN — Medication 600 MILLIGRAM(S): at 17:50

## 2018-09-20 RX ADMIN — Medication 600 MILLIGRAM(S): at 06:04

## 2018-09-20 RX ADMIN — Medication 975 MILLIGRAM(S): at 14:55

## 2018-09-20 RX ADMIN — OXYCODONE HYDROCHLORIDE 10 MILLIGRAM(S): 5 TABLET ORAL at 14:57

## 2018-09-20 RX ADMIN — LORATADINE 10 MILLIGRAM(S): 10 TABLET ORAL at 11:23

## 2018-09-20 RX ADMIN — Medication 600 MILLIGRAM(S): at 11:23

## 2018-09-20 RX ADMIN — PANTOPRAZOLE SODIUM 40 MILLIGRAM(S): 20 TABLET, DELAYED RELEASE ORAL at 06:04

## 2018-09-20 RX ADMIN — GABAPENTIN 300 MILLIGRAM(S): 400 CAPSULE ORAL at 06:04

## 2018-09-20 RX ADMIN — INFLUENZA VIRUS VACCINE 0.5 MILLILITER(S): 15; 15; 15; 15 SUSPENSION INTRAMUSCULAR at 17:50

## 2018-09-20 RX ADMIN — Medication 30 MILLIGRAM(S): at 06:04

## 2018-09-20 RX ADMIN — Medication 30 MILLIGRAM(S): at 00:11

## 2018-09-20 RX ADMIN — Medication 30 MILLIGRAM(S): at 01:40

## 2018-09-20 RX ADMIN — Medication 600 MILLIGRAM(S): at 07:18

## 2018-09-20 RX ADMIN — Medication 100 MILLIGRAM(S): at 14:55

## 2018-09-20 RX ADMIN — GABAPENTIN 300 MILLIGRAM(S): 400 CAPSULE ORAL at 14:55

## 2018-09-20 NOTE — PROGRESS NOTE ADULT - PROVIDER SPECIALTY LIST ADULT
Neurosurgery
Orthopedics
Pain Medicine
Orthopedics
Pain Medicine

## 2018-09-20 NOTE — DIETITIAN INITIAL EVALUATION ADULT. - NS AS NUTRI INTERV ED CONTENT
increased demand post-op. prioritizing healthy fats on keto diet. softer foods that comply w/ pt's dietary preferences./Purpose of the nutrition education

## 2018-09-20 NOTE — DIETITIAN INITIAL EVALUATION ADULT. - OTHER INFO
43yo s/p Anterior/Posterior Fusion C5-C7. Pt seen resting in bed. Currently on a soft diet and tolerating PO. Fair to good appetite, consuming >50% meals. Reports appetite is slowly returning. Denies N/V/D/C, had BM today. Pt follows a ketogenic diet (consumes eggs, red meat, tuna..etc.). Reports since begining this diet in May he has lost ~40lbs. UBW was 274lbs, current BW is 240lbs. This indicates a 34lb wt loss in 4-5mo (12.4% wt change). NKFA. Skin: surgical incision; GI WDL per flowsheet.

## 2018-09-20 NOTE — PROGRESS NOTE ADULT - SUBJECTIVE AND OBJECTIVE BOX
Pain Management Progress Note - [facility] [phone]    HPI: Patient seen and examined today, patient laying down in bed, in no apparent distress. Patient complains of neck and surgical site pain, but expresses' pain management with Dilaudid PCA. Discussed plan to d/c PCA today and to start Oral pain medication regimen with patient. Patient verbalized understanding.        Pertinent PMH:   Family history of thyroid disease (Mother)  Family history of ovarian cancer (Mother)  Handoff  MEWS Score  Sciatic leg pain  Lumbar nerve root impingement  Hypertension  LISBET on CPAP  Sleep apnea  Herniated cervical disc  Back pain  Cervical radiculopathy  Neck pain  Cervical radiculopathy  LISBET on CPAP  Hypertension  Cervical radiculopathy  H/O neck surgery  S/P cervical spinal fusion  S/P epidural steroid injection  History of back surgery  S/P lumbar fusion    Pain at: ___Back _x__Neck___Knee ___Hip ___Shoulder ___ Opioid tolerance    Pain is ___ sharp __x__dull ___burning _x__achy ___ Intensity: ____ mild _x___mod __x__severe     Location _____surgical site _____cervical _____lumbar ____abd _____upper ext____lower ext    Worse with _x___activity __x__movement ___x__physical therapy___ Rest    Improved with __x__medication _x___rest ____physical therapy    influenza   Vaccine  gabapentin  loratadine  lactated ringers.  acetaminophen   Tablet ..  metoclopramide Injectable  ondansetron Injectable  docusate sodium  magnesium hydroxide Suspension  senna  HYDROmorphone  Injectable  oxyCODONE    IR  oxyCODONE    IR  ceFAZolin   IVPB  ceFAZolin  Injectable.  HYDROmorphone PCA (1 mG/mL)  HYDROmorphone PCA (1 mG/mL)  lidocaine 2% Gel  metoclopramide Injectable  HYDROmorphone PCA (1 mG/mL) Rescue Clinician Bolus  cyclobenzaprine  HYDROmorphone PCA (1 mG/mL) Rescue Clinician Bolus  ketorolac   Injectable  acetaminophen   Tablet ..  acetaminophen  IVPB ..  sodium chloride 0.9%.  pantoprazole    Tablet  aluminum hydroxide/magnesium hydroxide/simethicone Suspension  oxyCODONE    IR  HYDROmorphone  Injectable      ROS: Const:  _-__febrile   Eyes:___ENT:___CV: __-_chest pain  Resp: __-__sob  GI:__-_nausea _-__vomiting _-___abd pain ___npo ___clears _x__full diet __bm  :___ Musk: _x__pain __x_spasm  Skin:___ Neuro:  __-_txiwwytn__-_rrowhdazv__-__ numbness _-__weakness _-__paresthesia  Psych:__-_anxiety  Endo:___ Heme:___Allergy:___  _x__ All other systems reviewed and negative      09-19 @ 07:82134 mL/min/1.73M2      Hemoglobin: 14.5 g/dL (09-19 @ 07:09)  Hemoglobin: 15.2 g/dL (09-18 @ 07:21)        T(C): 36.6 (09-19-18 @ 09:16), Max: 37.4 (09-19-18 @ 04:50)  HR: 81 (09-19-18 @ 09:16) (75 - 92)  BP: 117/58 (09-19-18 @ 09:16) (110/62 - 124/57)  RR: 17 (09-19-18 @ 09:16) (15 - 17)  SpO2: 93% (09-19-18 @ 09:16) (93% - 99%)  Wt(kg): --     PHYSICAL EXAM:  Gen Appearance: _x__no acute distress _x__appropriate         Neuro: _x__SILT feet____ EOM Intact Psych: AAOX_3_, _x__mood/affect appropriate        Eyes: _x__conjunctiva WNL  __x___ Pupils equal and round        ENT: _x__ears and nose atraumatic__x_ Hearing grossly intact        Neck: ___trachea midline, no visible masses ___thyroid without palpable mass    Resp: _x__Nml WOB____No tactile fremitus ___clear to auscultation    Cardio: _x__extremities free from edema __x__pedal pulses palpable    GI/Abdomen: _x__soft _____ Nontender__x____Nondistended_____HSM    Lymphatic: x___no palpable nodes in neck  _x__no palpable nodes calves and feet    Skin/Wound: _x_Incision, _x__Dressing c/d/i,   __x__surrounding tissues soft,  ___drain/chest tube present____    Muscular: EHL _5__/5  Gastrocnemius_5__/5    ___absent clubbing/cyanosis         ASSESSMENT:  This is a 42y old Male with a history of lumbar nerve root impingement, neck pain and cervical radiculopathy s/p revision ACDF PCD C5-C7 post op day2, pain controlled with current pain medication regimen.       Recommended Treatment PLAN:  1. Oxycodone 5-10mg Po Q4h prn moderate to severe pain  2. Dilaudid 0.5mg Q2h IVP prn breakthrough pain  3. gabapentin 300mg Po TID  4. tylenol 975mg Po Q8h standing  5. ibuprofen 600mg PO Q6h  Plan discussed with Guille De La Rosa
ORTHO NOTE    [x] Pt seen/examined.  [ ] Pt without any complaints/in NAD.    [x] Pt complains of: Neck pain. Reported slightly diminished sensation in LUE. Denied difficulty swallowing.       ROS: [ ] Fever  [ ] Chills  [ ] CP [ ] SOB [ ] Dysnea  [ ] Palpitations [ ] Cough [ ] N/V/C/D [ ] Paresthia [ ] Other     [x] ROS  otherwise negative        PHYSICAL EXAM:    Vital Signs Last 24 Hrs  T(C): 36.7 (18 Sep 2018 13:35), Max: 36.7 (18 Sep 2018 13:35)  T(F): 98.1 (18 Sep 2018 13:35), Max: 98.1 (18 Sep 2018 13:35)  HR: 92 (18 Sep 2018 13:35) (76 - 102)  BP: 121/59 (18 Sep 2018 13:35) (95/74 - 155/78)  BP(mean): 84 (17 Sep 2018 20:00) (80 - 130)  RR: 17 (18 Sep 2018 13:35) (12 - 18)  SpO2: 97% (18 Sep 2018 13:35) (93% - 98%)    I&O's Detail    17 Sep 2018 07:01  -  18 Sep 2018 07:00  --------------------------------------------------------  IN:    lactated ringers.: 1300 mL  Total IN: 1300 mL    OUT:    Accordian: 70 mL    Accordian: 30 mL    Indwelling Catheter - Urethral: 800 mL  Total OUT: 900 mL    Total NET: 400 mL      18 Sep 2018 07:01  -  18 Sep 2018 15:16  --------------------------------------------------------  IN:    lactated ringers.: 900 mL    Oral Fluid: 320 mL  Total IN: 1220 mL    OUT:    Accordian: 110 mL    Accordian: 30 mL    Voided: 1500 mL  Total OUT: 1640 mL    Total NET: -420 mL           CAPILLARY BLOOD GLUCOSE      POCT Blood Glucose.: 124 mg/dL (17 Sep 2018 22:53)                  Neuro: A+Ox3, NAD    Ext: Posterior cervical Prevena intact; anterior cervical dsg c/d/i  HVx2  b/l UE 4+/5 , bi/tri/delt  SILT slightly diminished on lateral aspect of left hand vs right; intact throughout arms  b/l ue WWP       LABS   18 Sep 2018 07:21    138    |  100    |  7      ----------------------------<  123    3.7     |  25     |  0.79     Ca    9.3        18 Sep 2018 07:21                                   15.2   14.5  )-----------( 178      ( 18 Sep 2018 07:21 )             45.3                 [ ] Other Labs  [ ] None ordered            Please check or [place] when present:  •  Heart Failure:    [ ] Acute        [ ]  Acute on Chronic        [ ] Chronic         [ ] Diastolic     [ ]  Combined    •  JOSÉ MIGUEL:     [ ] ATN        [ ]  Renal medullary necrosis       [ ]  Renal cortical necrosis                  [ ] Other pathological Lesion:  •  CKD:  [ ] Stage I   [ ] Stage II  [ ] Stage III    [ ]Stage IV   [ ]  CKD V   [ ]  Other/Unspecified:    •  Abdominal Nutritional Status:   [ ] Malnutrition-See Nutrition note    [ ] Cachexia   [ ]  Other        [ ] Supplement ordered:            [ ] Morbid Obesity: BMI >=40         ASSESSMENT/PLAN: 41yo male pod1 s/p ACDF, PSF C5-C7    CONTINUE:          [x] PT/OT, WBS - WBAT    [x] DVT PPX- SCDs    [x] Pain Mgt - Recs from Pain Mgmt appreciated, continue current regimen    [x] Dispo plan- Pending     Prevena dressing with intermittent beeping sound throughout the day. No air leaks found. Dressing reinforced. Dr. Gonzalez made aware, plan to keep Prevena until drains are discontinued.
ORTHO NOTE    [x] Pt seen/examined.  [ ] Pt without any complaints/in NAD.    [x] Pt complains of: Tightness in right trapezius and discomfort in neck. Reported slightly diminished sensation in LUE. Denied difficulty swallowing.          ROS: [ ] Fever  [ ] Chills  [ ] CP [ ] SOB [ ] Dysnea  [ ] Palpitations [ ] Cough [ ] N/V/C/D [ ] Paresthia [ ] Other     [x] ROS  otherwise negative        PHYSICAL EXAM:    Vital Signs Last 24 Hrs  T(C): 36.6 (19 Sep 2018 09:16), Max: 37.4 (19 Sep 2018 04:50)  T(F): 97.9 (19 Sep 2018 09:16), Max: 99.3 (19 Sep 2018 04:50)  HR: 81 (19 Sep 2018 09:16) (75 - 88)  BP: 117/58 (19 Sep 2018 09:16) (110/62 - 124/57)  BP(mean): --  RR: 17 (19 Sep 2018 09:16) (15 - 17)  SpO2: 93% (19 Sep 2018 09:16) (93% - 99%)    I&O's Detail    18 Sep 2018 07:01  -  19 Sep 2018 07:00  --------------------------------------------------------  IN:    lactated ringers.: 900 mL    Oral Fluid: 1200 mL    sodium chloride 0.9%.: 825 mL  Total IN: 2925 mL    OUT:    Accordian: 40 mL    Accordian: 135 mL    Voided: 4900 mL  Total OUT: 5075 mL    Total NET: -2150 mL           CAPILLARY BLOOD GLUCOSE    Neuro: A+Ox3, NAD    Ext: Posterior cervical Prevena intact; anterior cervical dsg c/d/i  HVx1  b/l UE 4+/5 , bi/tri/delt  SILT slightly diminished on lateral aspect of left hand vs right; intact throughout arms  b/l ue WW                         LABS   19 Sep 2018 07:09    140    |  103    |  7      ----------------------------<  102    3.9     |  26     |  0.85     Ca    9.0        19 Sep 2018 07:09                                   14.5   10.4  )-----------( 164      ( 19 Sep 2018 07:09 )             43.9                 [ ] Other Labs  [ ] None ordered            Please check or [place] when present:  •  Heart Failure:    [ ] Acute        [ ]  Acute on Chronic        [ ] Chronic         [ ] Diastolic     [ ]  Combined    •  JOSÉ MIGUEL:     [ ] ATN        [ ]  Renal medullary necrosis       [ ]  Renal cortical necrosis                  [ ] Other pathological Lesion:  •  CKD:  [ ] Stage I   [ ] Stage II  [ ] Stage III    [ ]Stage IV   [ ]  CKD V   [ ]  Other/Unspecified:    •  Abdominal Nutritional Status:   [ ] Malnutrition-See Nutrition note    [ ] Cachexia   [ ]  Other        [ ] Supplement ordered:            [ ] Morbid Obesity: BMI >=40         ASSESSMENT/PLAN:  41yo male pod2 s/p ACDF, PSF C5-C7    CONTINUE:          [x] PT/OT, WBS - WBAT    [x] DVT PPX- SCDs    [x] Pain Mgt - Recs from Pain Mgmt appreciated, regimen adjusted    [x] Dispo plan- Pending     HVx1 accidentally dislodged, Dr. Gonzalez made aware.  Per Dr. Gonzalez, remaining HV to be discontinued, Prevena to remain if possible.
Ortho    Pain better controlled with PCA clinician rescue bolus. Pending formal pain mgt consult today. Denies dysphagia. Endorses left trapezial pain.  Mild nausea   Denies CP, SOB, numbness/tingling     Vital Signs Last 24 Hrs  T(C): 36.2 (18 Sep 2018 08:30), Max: 36.4 (18 Sep 2018 04:55)  T(F): 97.1 (18 Sep 2018 08:30), Max: 97.5 (18 Sep 2018 04:55)  HR: 102 (18 Sep 2018 08:48) (76 - 102)  BP: 138/63 (18 Sep 2018 08:48) (95/74 - 155/78)  BP(mean): 84 (17 Sep 2018 20:00) (80 - 130)  RR: 16 (18 Sep 2018 08:48) (12 - 18)  SpO2: 93% (18 Sep 2018 08:48) (93% - 98%)    General: Pt Alert and oriented, NAD  DSG C/D/I, ant; prevena intact posteriorly   Pulses: 2+ rad bilat  Sensation:  SILT C5-T1 bilat (diminished in LUE C5-T1 compared to RUE)   Motor: RUE 4+ D, 4+ B, 5 T, 5 WE, 5 WF, 5 IO             LUE 4- D, 4+ B, 5 T, 5 WE, 5 WF, 5 IO       A/P: 42yMale s/p Anterior/Posterior Fusion C5-C7  - Stable  - Pain Control - pending Pain mgt consult in AM   - DVT ppx:  SCDs  - Post op abx: Ancef   - PT, WBS:  WBAT     Ortho Pager [phone]
Ortho    Pain improving on PCA and toradol. Trapezial pain endorsed but improved from yesterday. Patient OOB this morning, walking hallways. . Denies dysphagia.   Denies CP, SOB, numbness/tingling     Vital Signs Last 24 Hrs  T(C): 37.4 (19 Sep 2018 04:50), Max: 37.4 (19 Sep 2018 04:50)  T(F): 99.3 (19 Sep 2018 04:50), Max: 99.3 (19 Sep 2018 04:50)  HR: 83 (19 Sep 2018 04:50) (75 - 102)  BP: 114/55 (19 Sep 2018 04:50) (110/62 - 138/63)  BP(mean): --  ABP: --  ABP(mean): --  RR: 16 (19 Sep 2018 04:50) (15 - 18)  SpO2: 96% (19 Sep 2018 04:50) (93% - 99%)    General: Pt Alert and oriented, NAD  DSG C/D/I, ant; prevena intact posteriorly; Anterior dressing changed, incision c d i    Pulses: 2+ rad bilat  Sensation:  SILT C5-T1 bilat (diminished in LUE C5-T1 compared to RUE)   Motor: RUE 4+ D, 4+ B, 5 T, 5 WE, 5 WF, 5 IO             LUE 4- D, 4+ B, 5 T, 5 WE, 5 WF, 5 IO       A/P: 42yMale s/p Anterior/Posterior Fusion C5-C7  - Stable  - Pain Control per Pain Mgt  - DVT ppx:  SCDs  - Post op abx: Ancef   - PT, WBS:  WBAT     Ortho Pager [phone]
Ortho    Pain improving. Moblizing more. Felt better after getting sleep Trapezial pain endorsed but improved from yesterday. Patient OOB this morning, walking hallways. . Denies dysphagia.   Denies CP, SOB, numbness/tingling     Vital Signs Last 24 Hrs  T(C): 36.2 (20 Sep 2018 09:12), Max: 37.3 (19 Sep 2018 14:44)  T(F): 97.1 (20 Sep 2018 09:12), Max: 99.2 (19 Sep 2018 14:44)  HR: 95 (20 Sep 2018 09:12) (61 - 95)  BP: 160/94 (20 Sep 2018 09:12) (104/62 - 160/94)  BP(mean): --  RR: 17 (20 Sep 2018 09:12) (16 - 17)  SpO2: 96% (20 Sep 2018 09:12) (95% - 97%)    General: Pt Alert and oriented, NAD  DSG C/D/I, ant; prevena intact posteriorly; Anterior dressing dry    Pulses: 2+ rad bilat  Sensation:  SILT C5-T1 bilat (diminished in LUE C5-T1 compared to RUE)   Motor: RUE 5 D, 5 B, 5 T, 5 WE, 5 WF, 5 IO             LUE 4+ D, 5 B, 5 T, 5 WE, 5 WF, 5 IO       A/P: 42yMale s/p Anterior/Posterior Fusion C5-C7  - Stable  - Pain Control per Pain Mgt  - DVT ppx:  SCDs  - Post op abx: Ancef   - PT, WBS:  WBAT     Ortho Pager [phone]
Ortho Post Op Check    Procedure: Anterior/Posterior Fusion C5-C7  Surgeon: TIndel    Pain endorsed with moderate control from PCA. Mild nausea   Denies CP, SOB, numbness/tingling     Vital Signs Last 24 Hrs  T(C): 35.8 (09-17-18 @ 22:10), Max: 35.8 (09-17-18 @ 22:10)  T(F): 96.4 (09-17-18 @ 22:10), Max: 96.4 (09-17-18 @ 22:10)  HR: 77 (09-17-18 @ 22:10) (77 - 88)  BP: 134/68 (09-17-18 @ 22:10) (95/74 - 134/68)  BP(mean): 84 (09-17-18 @ 20:00) (84 - 84)  RR: 18 (09-17-18 @ 22:10) (17 - 18)  SpO2: 95% (09-17-18 @ 22:10) (95% - 97%)  AVSS    General: Pt Alert and oriented, NAD  DSG C/D/I, ant; prevena intact posteriorly   Pulses: 2+ rad bilat  Sensation:  SILT C5-T1 bilat (diminished in LUE C5-T1 compared to RUE)   Motor: RUE 4+ D, 4+ B, 5 T, 5 WE, 5 WF, 5 IO             LUE 4- D, 4+ B, 5 T, 5 WE, 5 WF, 5 IO       A/P: 42yMale s/p Anterior/Posterior Fusion C5-C7  - Stable  - Pain Control - pending Pain mgt consult in AM   - DVT ppx:  SCDs  - Post op abx: Ancef   - PT, WBS:  WBAT     Ortho Pager [phone]
Pain Management Progress Note - [facility] [phone]    HPI: Patient seen by Dr. Lentz at 8:30am. Laying in bed in [facility], states pain is well controlled. Patient is planning for discharge home today.      Pertinent PMH: Pain at: ___Back _x__Neck___Knee ___Hip ___Shoulder ___ Opioid tolerance    Pain is __x_ sharp ____dull __x_burning ___achy ___ Intensity: ____ mild __x__mod ____severe     Location __x___surgical site __x___cervical _____lumbar ____abd _____upper ext____lower ext    Worse with __x__activity __x__movement _____physical therapy___ Rest    Improved with _x___medication __x__rest ____physical therapy    influenza   Vaccine  loratadine  lactated ringers.  acetaminophen   Tablet ..  metoclopramide Injectable  ondansetron Injectable  docusate sodium  magnesium hydroxide Suspension  senna  HYDROmorphone  Injectable  oxyCODONE    IR  ceFAZolin   IVPB  ([place])  ceFAZolin  Injectable.  HYDROmorphone PCA (1 mG/mL)  metoclopramide Injectable  HYDROmorphone PCA (1 mG/mL) Rescue Clinician Bolus  cyclobenzaprine  HYDROmorphone PCA (1 mG/mL) Rescue Clinician Bolus  ketorolac   Injectable  acetaminophen   Tablet ..  acetaminophen  IVPB ..  sodium chloride 0.9%.  pantoprazole    Tablet  aluminum hydroxide/magnesium hydroxide/simethicone Suspension  oxyCODONE    IR  HYDROmorphone  Injectable  ibuprofen  Tablet.      ROS: Const:  __-_febrile   Eyes:___ENT:___CV: _-__chest pain  Resp: __-__sob  GI:___nausea ___vomiting ____abd pain ___npo ___clears ___full diet __bm  :___ Musk: _x__pain ___spasm  Skin:___ Neuro:  ___sedation___confusion____ numbness ___weakness ___paresthesia  Psych:___anxiety  Endo:___ Heme:___Allergy:___  __x__ all other systems reviewed and negative       Hemoglobin: 14.5 g/dL (09-19 @ 07:09)      T(C): 36.2 (09-20-18 @ 09:12), Max: 37.3 (09-19-18 @ 14:44)  HR: 95 (09-20-18 @ 09:12) (61 - 95)  BP: 160/94 (09-20-18 @ 09:12) (104/62 - 160/94)  RR: 17 (09-20-18 @ 09:12) (16 - 17)  SpO2: 96% (09-20-18 @ 09:12) (95% - 97%)  Wt(kg): --     PHYSICAL EXAM:  Gen Appearance: _x__no acute distress __x_appropriate      Neuro: _x__SILT feet____ EOM Intact Psych: AAOX_3_, __x_mood/affect appropriate        Eyes: _x__conjunctiva WNL  _____ Pupils equal and round        ENT: _x__ears and nose atraumatic__x_ Hearing grossly intact        Neck: _x__trachea midline, no visible masses ___thyroid without palpable mass    Resp: _x__Nml WOB____No tactile fremitus ___clear to auscultation    Cardio: _x__extremities free from edema __x__pedal pulses palpable    GI/Abdomen: _x__soft ___x__ Nontender____x__Nondistended_____HSM    Lymphatic: ___no palpable nodes in neck  ___no palpable nodes calves and feet    Skin/Wound: ___Incision, __x_Dressing c/d/i,   __x__surrounding tissues soft,  ___drain/chest tube present____    Muscular: EHL __5_/5  Gastrocnemius_5__/5    _x__absent clubbing/cyanosis   __x_b/l LE neuropathy (chronic)    ASSESSMENT:  This is a 42y old Male with a history of:  M96.0 M47.812 M54.12 M48.02G54.2 C58  Family history of thyroid disease (Mother)  Family history of ovarian cancer (Mother)  Handoff  MEWS Score  Sciatic leg pain  Lumbar nerve root impingement  Hypertension  LISBET on CPAP  Sleep apnea  Herniated cervical disc  Back pain  Cervical radiculopathy  Neck pain  Cervical radiculopathy  LISBET on CPAP  Hypertension  Cervical radiculopathy  H/O neck surgery  S/P cervical spinal fusion  S/P epidural steroid injection  History of back surgery  S/P lumbar fusion        Recommended Treatment PLAN:    Per Dr. Lentz, continue current regimen as patient is tolerating and responding well.
Patient seen, examined in preop area with his wife at chairside. His exam is unchanged with regard to neurological testing.    The condition and treatment options were discussed with the patient.  The risks, benefits and alternatives to surgery was discussed. The primary goal of surgery is to fuse the nonunion at C6-7.  We discussed the risks, benefits and alternatives to removal of the anterior plate and screws, exploration of the C5-6 fusion (believed to be healed) and removal of the interbody spacer at C6-7 and insertion of a structural, donated bone graft supplemented with iliac crest (separate incision) bone graft and re-application new plate and screws with posterior spinal fusion with local and donated bone and screws and rods.  He asked if the spinal cord stimulator could be removed at this time and we discussed the risks, benefits and alternatives to this add--on procedure.  He will consider this at a later date and also discuss the above with Dr. Germain (the surgeon who placed the stimulator).    The complications of surgery were also discussed and include, but are not limited to, wound problems, infection, bleeding, vascular injury, nerve injury, paralysis, cerebrospinal fluid leak, persistent symptoms, hardware failure, loss of fixation, persistent non-union/pseudarthrosis, junctional/adjacent level disease, difficulty swallowing, hoarse voice, vocal cord paralysis, injury to the recurrent laryngeal nerve (increased with revision surgery), esophageal injury, deep vein thrombosis, pulmonary embolus, myocardial infarction, retained hardware, stroke, death and need for additional surgery.    All questions were answered and informed consent was obtained.    Notes and/or documentation by others in this electronic record are signed but not reviewed for accuracy.  NLT
doing better, slept 6 hours last night! pain manageable. tolerating regular diet; vocalizing well.  left shoulder motion/discomfort improving  PE: dressings intact and dry  motor 5/5  sensory intact  dtr unchanged  no calf tenderness  imp: pod 3  pain management  incentive spirometer/scds while in bed  discharge planning to see patient  call for any questions fever, chills or wound drainage, shortness of breath or breathing difficulty  fu 7 - 10 days
doing much better with regard to pain; drains removed, caesar in place.  ambulating independently  swallowing without any difficulty;   PE: motor 5/5  sensory intact  no calf tenderness  dressings intact and dry  imp: POD 2   pain under better control  continue pain management  incentive spirometer  scd's when in bed  continue progressive ambulation
patient seen, examined. Wife spent night in room.  Did well overnight except for neck pain.  .  Mild left shoulder discomfort noted.  Swallowing without difficulty, has been out of bed, ambulating to bathroom, voiding without difficulty after nam catheter removed.   Vocalizing well.  Tolerating fluids well.    PE:   sitting up on edge of bed  dressings dry and intact; drains in place- drainage noted  motor 5/5 with left shoulder tightness but no focal neurological findings  sensory intact to light touch  deep tendon reflexes unchanged.  no calf tenderness (sequential compression devices are to be on and in place when in bed)    imp:  pod #1   pain management- discussed IV tylenol, IV toradol  follow drain outputs  sequential compression devices while in bed  incentive spirometry (I personally reviewed instructions and technique with patient)  progressive ambulation  Patient aware to avoid exposure to cigarette smoke, Non-steroidal anti-inflammatories and to supplement with vitamin D, calcium, metamucil/colace   encourage hydration  upon discharge, patient advised to call for questions, fevers, chills or any wound drainage  go to nearest emergency room for any new symptoms, difficulty swallowing or breathing  follow-up in 7-10 days upon dishcharge

## 2018-09-20 NOTE — PROGRESS NOTE ADULT - REASON FOR ADMISSION
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
neck pain/surgery

## 2018-09-20 NOTE — DIETITIAN INITIAL EVALUATION ADULT. - ENERGY NEEDS
Height: 6'2" Weight: 240lbs, IBW 190lbs+/-10%, %%, BMI 30.8  IBW used for calculations as pt >120% of IBW   Nutrient needs based on [facility] standards of care for maintenance in adults.   Needs adjusted for post-op

## 2018-09-21 LAB — SURGICAL PATHOLOGY STUDY: SIGNIFICANT CHANGE UP

## 2019-05-11 ENCOUNTER — TRANSCRIPTION ENCOUNTER (OUTPATIENT)
Age: 44
End: 2019-05-11

## 2019-10-31 ENCOUNTER — APPOINTMENT (OUTPATIENT)
Dept: UROLOGY | Facility: CLINIC | Age: 44
End: 2019-10-31
Payer: MEDICARE

## 2019-10-31 VITALS
OXYGEN SATURATION: 97 % | SYSTOLIC BLOOD PRESSURE: 128 MMHG | WEIGHT: 225 LBS | HEIGHT: 74.5 IN | TEMPERATURE: 98.2 F | HEART RATE: 70 BPM | DIASTOLIC BLOOD PRESSURE: 78 MMHG | BODY MASS INDEX: 28.57 KG/M2

## 2019-10-31 DIAGNOSIS — Z87.891 PERSONAL HISTORY OF NICOTINE DEPENDENCE: ICD-10-CM

## 2019-10-31 PROCEDURE — 99204 OFFICE O/P NEW MOD 45 MIN: CPT

## 2019-10-31 NOTE — REVIEW OF SYSTEMS
[Feeling Tired] : feeling tired [Poor quality erections] : Poor quality erections [No erections] : no erections [Premature ejaculation] : premature ejaculation [Joint Pain] : joint pain [Joint Swelling] : joint swelling [Itching] : itching [Limb Weakness] : limb weakness [Difficulty Walking] : difficulty walking [see HPI] : see HPI [Muscle Weakness] : muscle weakness [Negative] : Heme/Lymph

## 2019-11-01 PROBLEM — Z87.891 FORMER SMOKER: Status: ACTIVE | Noted: 2019-10-31

## 2019-11-01 NOTE — ASSESSMENT
[FreeTextEntry1] : 43 yo M treated with TRT for hypogonadism, with improvement of hypogonadal symptoms. He presents here because his insurance changed and TRT injections became too expensive with his previous provider. He is requesting Rx to self administer injections. Will Rx. \par \par We discussed low serum testosterone and its treatment. Low serum T can result in fatigue, depression, loss of libido, and low of muscle mass and bone mass. Testosterone replacement can be done with topical gel or with IM injection. Gel has the advantage of maintaining steady state in serum, but has the disadvantage of possibly being spread to partners and children with casual contact. The injection has less risk of spread to contacts, but has higher serum T level fluctuation. The Gel is applied daily, whereas the injections are applied every month to every 2 weeks depending on efficacy. We discussed the possible side effects, which include polycythemia, hepatic dysfunction, increased LUTS, exacerbation of LISBET, and stimulation of preexisting prostate cancer. As such, pt will undergo screening and routine CBC, LFTs, as well as baseline PSA. Pt understands this. \par \par For ED, recommended continuing sildenafil. \par \par For premature ejaculation mixed with anorgasmia, recommended observation for now as the symptoms.

## 2019-11-01 NOTE — PHYSICAL EXAM
[General Appearance - Well Developed] : well developed [General Appearance - Well Nourished] : well nourished [Normal Appearance] : normal appearance [Well Groomed] : well groomed [General Appearance - In No Acute Distress] : no acute distress [Edema] : no peripheral edema [Respiration, Rhythm And Depth] : normal respiratory rhythm and effort [Exaggerated Use Of Accessory Muscles For Inspiration] : no accessory muscle use [Abdomen Soft] : soft [Abdomen Tenderness] : non-tender [Costovertebral Angle Tenderness] : no ~M costovertebral angle tenderness [Urethral Meatus] : meatus normal [Penis Abnormality] : normal circumcised penis [Urinary Bladder Findings] : the bladder was normal on palpation [Scrotum] : the scrotum was normal [Testes Tenderness] : no tenderness of the testes [Testes Mass (___cm)] : there were no testicular masses [Normal Station and Gait] : the gait and station were normal for the patient's age [] : no rash [No Focal Deficits] : no focal deficits [Oriented To Time, Place, And Person] : oriented to person, place, and time [Affect] : the affect was normal [Mood] : the mood was normal [Not Anxious] : not anxious [No Palpable Adenopathy] : no palpable adenopathy

## 2019-11-01 NOTE — HISTORY OF PRESENT ILLNESS
[FreeTextEntry1] : 44 year old man with complaint of fatigue, low libido, and depressed mood. He had been on TRT by [facility]. His dose was titrate to T cypionate 170 mcg/week IM. On this dose, a recent assay of serum testosterone showed 738. His symptoms began years ago. His symptoms improve on this regimen, but have not completely resolved. No suicidal ideation. No symptoms of LISBET. No blurry vision or headaches. He reports erectile dysfunction, but good erections with sildenafil 20 mg (40 mg in half.) He complains of history or premature ejaculation, but on sildenafil now he has delayed or no orgasm. He does not want to preserve fertility. \par Hypogonadal symptoms are moderate in severity. Nothing makes the symptoms better, nothing makes sx worse. It is associated with ED as described.\par No hematuria, no dysuria, no frequency, no urgency, no hesitancy, no straining. No incontinence. \par No fevers, no chills, no nausea, no vomiting, no flank pain. \par \par No family history contributory to hypogonadism, ED, premature ejaculation.

## 2019-11-14 ENCOUNTER — APPOINTMENT (OUTPATIENT)
Dept: UROLOGY | Facility: CLINIC | Age: 44
End: 2019-11-14
Payer: MEDICARE

## 2019-11-14 PROCEDURE — 99213 OFFICE O/P EST LOW 20 MIN: CPT

## 2019-11-14 NOTE — HISTORY OF PRESENT ILLNESS
[FreeTextEntry1] : 44 year old man with complaint of fatigue, low libido, and depressed mood. He had been on TRT by [facility]. His dose was titrate to T cypionate 170 mcg/week IM. On this dose, a recent assay of serum testosterone showed 738. His symptoms began years ago. His symptoms improve on this regimen, but have not completely resolved. No suicidal ideation. No symptoms of LISBET. No blurry vision or headaches. He reports erectile dysfunction, but good erections with sildenafil 20 mg (40 mg in half.) He complains of history or premature ejaculation, but on sildenafil now he has delayed or no orgasm. He does not want to preserve fertility. \par Hypogonadal symptoms are moderate in severity. Nothing makes the symptoms better, nothing makes sx worse. It is associated with ED as described.\par No hematuria, no dysuria, no frequency, no urgency, no hesitancy, no straining. No incontinence. \par No fevers, no chills, no nausea, no vomiting, no flank pain. No family history contributory to hypogonadism, ED, premature ejaculation. \par \par 11/14/2019: Patient presents for follow up. He presents to discuss his dosage of testosterone. While he had previously stated that he had taken 170 mg, now he says he thinks he had taken 1.7 mL. Call placed to his previous provider, who confirmed he was getting 300 mg weekly, or 1.5 mL of 200 mg/mL. He reports hypogonadal and  symptoms and other medical  issues remain unchanged from above. No hematuria, no dysuria, no frequency, no urgency, no hesitancy, no straining. No incontinence. No fevers, no chills, no nausea, no vomiting, no flank pain.

## 2019-11-14 NOTE — PHYSICAL EXAM
[General Appearance - Well Developed] : well developed [General Appearance - Well Nourished] : well nourished [Normal Appearance] : normal appearance [General Appearance - In No Acute Distress] : no acute distress [Well Groomed] : well groomed [Abdomen Tenderness] : non-tender [Abdomen Soft] : soft [Costovertebral Angle Tenderness] : no ~M costovertebral angle tenderness [Urinary Bladder Findings] : the bladder was normal on palpation [Edema] : no peripheral edema [] : no respiratory distress [Exaggerated Use Of Accessory Muscles For Inspiration] : no accessory muscle use [Respiration, Rhythm And Depth] : normal respiratory rhythm and effort [Oriented To Time, Place, And Person] : oriented to person, place, and time [Affect] : the affect was normal [Mood] : the mood was normal [Not Anxious] : not anxious [Normal Station and Gait] : the gait and station were normal for the patient's age [No Focal Deficits] : no focal deficits [No Palpable Adenopathy] : no palpable adenopathy

## 2019-11-14 NOTE — REVIEW OF SYSTEMS
[Feeling Tired] : feeling tired [Poor quality erections] : Poor quality erections [No erections] : no erections [Premature ejaculation] : premature ejaculation [Joint Swelling] : joint swelling [Joint Pain] : joint pain [Itching] : itching [Limb Weakness] : limb weakness [Difficulty Walking] : difficulty walking [see HPI] : see HPI [Muscle Weakness] : muscle weakness [Negative] : Heme/Lymph

## 2019-11-14 NOTE — ASSESSMENT
[FreeTextEntry1] : 43 yo M treated with TRT for hypogonadism, with improvement of hypogonadal symptoms. He presents here because his insurance changed and TRT injections became too expensive with his previous provider. He is requesting Rx to self administer injections. Per patient's previous provider, he was indeed on 300 mg qWeek. We discussed that this is quite a high dose, but pt insists this was how much was needed to control his symptoms. Will obtain surveillance tabs. Will Rx for this dose.

## 2019-12-19 ENCOUNTER — MEDICATION RENEWAL (OUTPATIENT)
Age: 44
End: 2019-12-19

## 2020-02-01 NOTE — DIETITIAN INITIAL EVALUATION ADULT. - PROBLEM SELECTOR PROBLEM 1
Discharge Instruction for Undelivered Patients      You were seen for: Labor Assessment  We Consulted: Steven SILVERMANM  You had (Test or Medicine):IV fluids, fetal and uterine monitoring, and tylenol     Diet:   Drink 8 to 12 glasses of liquids (milk, juice, water) every day.  You may eat meals and snacks.     Activity:  Call your doctor or nurse midwife if your baby is moving less than usual.     Call your provider if you notice:  Swelling in your face or increased swelling in your hands or legs.  Headaches that are not relieved by Tylenol (acetaminophen).  Changes in your vision (blurring: seeing spots or stars.)  Nausea (sick to your stomach) and vomiting (throwing up).   Weight gain of 5 pounds or more per week.  Heartburn that doesn't go away.  Signs of bladder infection: pain when you urinate (use the toilet), need to go more often and more urgently.  The bag of drew (rupture of membranes) breaks, or you notice leaking in your underwear.  Bright red blood in your underwear.  Abdominal (lower belly) or stomach pain.  For first baby: Contractions (tightening) less than 5 minutes apart for one hour or more.  Second (plus) baby: Contractions (tightening) less than 10 minutes apart and getting stronger.  *If less than 34 weeks: Contractions (tightenings) more than 6 times in one hour.  Increase or change in vaginal discharge (note the color and amount)      Follow-up:  As scheduled in the clinic      Finish course of Tamiflu  Consider Tylenol, nasal decongestant   Cervical radiculopathy

## 2020-06-04 ENCOUNTER — APPOINTMENT (OUTPATIENT)
Dept: UROLOGY | Facility: CLINIC | Age: 45
End: 2020-06-04
Payer: MEDICARE

## 2020-06-04 VITALS — WEIGHT: 225 LBS | BODY MASS INDEX: 28.88 KG/M2 | HEIGHT: 74 IN | TEMPERATURE: 99.7 F

## 2020-06-04 PROCEDURE — 99213 OFFICE O/P EST LOW 20 MIN: CPT

## 2020-06-04 NOTE — REVIEW OF SYSTEMS
[Feeling Tired] : feeling tired [Poor quality erections] : Poor quality erections [No erections] : no erections [Premature ejaculation] : premature ejaculation [Joint Pain] : joint pain [Joint Swelling] : joint swelling [Itching] : itching [Difficulty Walking] : difficulty walking [Limb Weakness] : limb weakness [Muscle Weakness] : muscle weakness [see HPI] : see HPI [Negative] : Heme/Lymph

## 2020-06-04 NOTE — PHYSICAL EXAM
[General Appearance - Well Developed] : well developed [General Appearance - Well Nourished] : well nourished [Normal Appearance] : normal appearance [General Appearance - In No Acute Distress] : no acute distress [Well Groomed] : well groomed [Abdomen Soft] : soft [Abdomen Tenderness] : non-tender [Costovertebral Angle Tenderness] : no ~M costovertebral angle tenderness [Urinary Bladder Findings] : the bladder was normal on palpation [Edema] : no peripheral edema [] : no respiratory distress [Respiration, Rhythm And Depth] : normal respiratory rhythm and effort [Exaggerated Use Of Accessory Muscles For Inspiration] : no accessory muscle use [Oriented To Time, Place, And Person] : oriented to person, place, and time [Affect] : the affect was normal [Mood] : the mood was normal [Not Anxious] : not anxious [Normal Station and Gait] : the gait and station were normal for the patient's age [No Palpable Adenopathy] : no palpable adenopathy [No Focal Deficits] : no focal deficits

## 2020-06-04 NOTE — HISTORY OF PRESENT ILLNESS
[FreeTextEntry1] : 44 year old man with complaint of fatigue, low libido, and depressed mood. He had been on TRT by [facility]. His dose was titrate to T cypionate 170 mcg/week IM. On this dose, a recent assay of serum testosterone showed 738. His symptoms began years ago. His symptoms improve on this regimen, but have not completely resolved. No suicidal ideation. No symptoms of LISBET. No blurry vision or headaches. He reports erectile dysfunction, but good erections with sildenafil 20 mg (40 mg in half.) He complains of history or premature ejaculation, but on sildenafil now he has delayed or no orgasm. He does not want to preserve fertility. \par Hypogonadal symptoms are moderate in severity. Nothing makes the symptoms better, nothing makes sx worse. It is associated with ED as described.\par No hematuria, no dysuria, no frequency, no urgency, no hesitancy, no straining. No incontinence. \par No fevers, no chills, no nausea, no vomiting, no flank pain. No family history contributory to hypogonadism, ED, premature ejaculation. \par \par 11/14/2019: Patient presents for follow up. He presents to discuss his dosage of testosterone. While he had previously stated that he had taken 170 mg, now he says he thinks he had taken 1.7 mL. Call placed to his previous provider, who confirmed he was getting 300 mg weekly, or 1.5 mL of 200 mg/mL. He reports hypogonadal and  symptoms and other medical  issues remain unchanged from above. No hematuria, no dysuria, no frequency, no urgency, no hesitancy, no straining. No incontinence. No fevers, no chills, no nausea, no vomiting, no flank pain. \par \par 06/04/2020: Patient presents for follow up. He reports good improvement of his fatigue, libido with TRT. He is administering self injections without difficulty. He says he obtained labs, but does not have copy. He says Dr Lemus, his PCP, does have them. Does report mild ED. He had good response to sildenafil in the past, but not bothered right now as ED is mild. No new  symptoms and other medical  issues remain unchanged from above. No hematuria, no dysuria, no frequency, no urgency, no hesitancy, no straining. No incontinence. No fevers, no chills, no nausea, no vomiting, no flank pain.

## 2020-06-04 NOTE — ASSESSMENT
[FreeTextEntry1] : 45 yo M treated with TRT for hypogonadism, with improvement of hypogonadal symptoms. Will call PCP for labs. If wnl, can follow up in 6 months for repeat labs and symptom check. Pt will continue TRT for now. He will call if ED becomes bothersome if he wants to try sildenafil again.

## 2020-11-10 ENCOUNTER — APPOINTMENT (OUTPATIENT)
Dept: UROLOGY | Facility: CLINIC | Age: 45
End: 2020-11-10
Payer: MEDICARE

## 2020-11-10 VITALS
BODY MASS INDEX: 28.49 KG/M2 | OXYGEN SATURATION: 97 % | HEART RATE: 82 BPM | HEIGHT: 74 IN | DIASTOLIC BLOOD PRESSURE: 80 MMHG | WEIGHT: 222 LBS | SYSTOLIC BLOOD PRESSURE: 132 MMHG | TEMPERATURE: 97.3 F

## 2020-11-10 PROCEDURE — 99213 OFFICE O/P EST LOW 20 MIN: CPT

## 2020-11-10 NOTE — HISTORY OF PRESENT ILLNESS
[FreeTextEntry1] : 44 year old man with complaint of fatigue, low libido, and depressed mood. He had been on TRT by [facility]. His dose was titrate to T cypionate 170 mcg/week IM. On this dose, a recent assay of serum testosterone showed 738. His symptoms began years ago. His symptoms improve on this regimen, but have not completely resolved. No suicidal ideation. No symptoms of LISBET. No blurry vision or headaches. He reports erectile dysfunction, but good erections with sildenafil 20 mg (40 mg in half.) He complains of history or premature ejaculation, but on sildenafil now he has delayed or no orgasm. He does not want to preserve fertility. \par Hypogonadal symptoms are moderate in severity. Nothing makes the symptoms better, nothing makes sx worse. It is associated with ED as described.\par No hematuria, no dysuria, no frequency, no urgency, no hesitancy, no straining. No incontinence. \par No fevers, no chills, no nausea, no vomiting, no flank pain. No family history contributory to hypogonadism, ED, premature ejaculation. \par \par 11/14/2019: Patient presents for follow up. He presents to discuss his dosage of testosterone. While he had previously stated that he had taken 170 mg, now he says he thinks he had taken 1.7 mL. Call placed to his previous provider, who confirmed he was getting 300 mg weekly, or 1.5 mL of 200 mg/mL. He reports hypogonadal and  symptoms and other medical  issues remain unchanged from above. No hematuria, no dysuria, no frequency, no urgency, no hesitancy, no straining. No incontinence. No fevers, no chills, no nausea, no vomiting, no flank pain. \par \par 06/04/2020: Patient presents for follow up. He reports good improvement of his fatigue, libido with TRT. He is administering self injections without difficulty. He says he obtained labs, but does not have copy. He says Dr Lemus, his PCP, does have them. Does report mild ED. He had good response to sildenafil in the past, but not bothered right now as ED is mild. No new  symptoms and other medical  issues remain unchanged from above. No hematuria, no dysuria, no frequency, no urgency, no hesitancy, no straining. No incontinence. No fevers, no chills, no nausea, no vomiting, no flank pain. \par \par 11/10/2020: Patient presents for follow up. He reports good control of fatigue and libido with TRT. No problems with injections. He is awaiting C spine surgery so has neck pain, but no new  symptoms and other medical  issues remain unchanged from above. No hematuria, no dysuria, no frequency, no urgency, no hesitancy, no straining. No incontinence. No fevers, no chills, no nausea, no vomiting, no flank pain.

## 2020-11-10 NOTE — ASSESSMENT
[FreeTextEntry1] : 46 yo M treated with TRT for hypogonadism, with improvement of hypogonadal symptoms. Pt will continue TRT for now.

## 2021-02-09 ENCOUNTER — NON-APPOINTMENT (OUTPATIENT)
Age: 46
End: 2021-02-09

## 2021-02-25 ENCOUNTER — TRANSCRIPTION ENCOUNTER (OUTPATIENT)
Age: 46
End: 2021-02-25

## 2021-05-07 LAB
ALBUMIN SERPL ELPH-MCNC: 4.4 G/DL
ALP BLD-CCNC: 53 U/L
ALT SERPL-CCNC: 20 U/L
ANION GAP SERPL CALC-SCNC: 13 MMOL/L
AST SERPL-CCNC: 22 U/L
BASOPHILS # BLD AUTO: 0.07 K/UL
BASOPHILS NFR BLD AUTO: 0.9 %
BILIRUB SERPL-MCNC: 0.3 MG/DL
BUN SERPL-MCNC: 8 MG/DL
CALCIUM SERPL-MCNC: 9.6 MG/DL
CHLORIDE SERPL-SCNC: 105 MMOL/L
CHOLEST SERPL-MCNC: 133 MG/DL
CO2 SERPL-SCNC: 23 MMOL/L
CREAT SERPL-MCNC: 1.01 MG/DL
EOSINOPHIL # BLD AUTO: 0.08 K/UL
EOSINOPHIL NFR BLD AUTO: 1 %
GLUCOSE SERPL-MCNC: 115 MG/DL
HCT VFR BLD CALC: 45.7 %
HDLC SERPL-MCNC: 39 MG/DL
HGB BLD-MCNC: 14.7 G/DL
IMM GRANULOCYTES NFR BLD AUTO: 0.3 %
LDLC SERPL CALC-MCNC: 82 MG/DL
LYMPHOCYTES # BLD AUTO: 2.2 K/UL
LYMPHOCYTES NFR BLD AUTO: 27.7 %
MAN DIFF?: NORMAL
MCHC RBC-ENTMCNC: 29.3 PG
MCHC RBC-ENTMCNC: 32.2 GM/DL
MCV RBC AUTO: 91 FL
MONOCYTES # BLD AUTO: 0.69 K/UL
MONOCYTES NFR BLD AUTO: 8.7 %
NEUTROPHILS # BLD AUTO: 4.87 K/UL
NEUTROPHILS NFR BLD AUTO: 61.4 %
NONHDLC SERPL-MCNC: 95 MG/DL
PLATELET # BLD AUTO: 221 K/UL
POTASSIUM SERPL-SCNC: 4.3 MMOL/L
PROT SERPL-MCNC: 7.3 G/DL
PSA SERPL-MCNC: 1.41 NG/ML
RBC # BLD: 5.02 M/UL
RBC # FLD: 14.6 %
SODIUM SERPL-SCNC: 140 MMOL/L
TRIGL SERPL-MCNC: 62 MG/DL
WBC # FLD AUTO: 7.93 K/UL

## 2021-05-11 ENCOUNTER — APPOINTMENT (OUTPATIENT)
Dept: UROLOGY | Facility: CLINIC | Age: 46
End: 2021-05-11
Payer: MEDICARE

## 2021-05-11 VITALS
DIASTOLIC BLOOD PRESSURE: 73 MMHG | SYSTOLIC BLOOD PRESSURE: 137 MMHG | HEART RATE: 72 BPM | HEIGHT: 74 IN | OXYGEN SATURATION: 98 % | WEIGHT: 212 LBS | TEMPERATURE: 96.7 F | BODY MASS INDEX: 27.21 KG/M2

## 2021-05-11 LAB
TESTOST BND SERPL-MCNC: 39.5 PG/ML
TESTOST SERPL-MCNC: 1524.9 NG/DL

## 2021-05-11 PROCEDURE — 99072 ADDL SUPL MATRL&STAF TM PHE: CPT

## 2021-05-11 PROCEDURE — 99213 OFFICE O/P EST LOW 20 MIN: CPT

## 2021-05-11 NOTE — HISTORY OF PRESENT ILLNESS
[FreeTextEntry1] : 44 year old man with complaint of fatigue, low libido, and depressed mood. He had been on TRT by [facility]. His dose was titrate to T cypionate 170 mcg/week IM. On this dose, a recent assay of serum testosterone showed 738. His symptoms began years ago. His symptoms improve on this regimen, but have not completely resolved. No suicidal ideation. No symptoms of LISBET. No blurry vision or headaches. He reports erectile dysfunction, but good erections with sildenafil 20 mg (40 mg in half.) He complains of history or premature ejaculation, but on sildenafil now he has delayed or no orgasm. He does not want to preserve fertility. \par Hypogonadal symptoms are moderate in severity. Nothing makes the symptoms better, nothing makes sx worse. It is associated with ED as described.\par No hematuria, no dysuria, no frequency, no urgency, no hesitancy, no straining. No incontinence. \par No fevers, no chills, no nausea, no vomiting, no flank pain. No family history contributory to hypogonadism, ED, premature ejaculation. \par \par 11/14/2019: Patient presents for follow up. He presents to discuss his dosage of testosterone. While he had previously stated that he had taken 170 mg, now he says he thinks he had taken 1.7 mL. Call placed to his previous provider, who confirmed he was getting 300 mg weekly, or 1.5 mL of 200 mg/mL. He reports hypogonadal and  symptoms and other medical  issues remain unchanged from above. No hematuria, no dysuria, no frequency, no urgency, no hesitancy, no straining. No incontinence. No fevers, no chills, no nausea, no vomiting, no flank pain. \par \par 06/04/2020: Patient presents for follow up. He reports good improvement of his fatigue, libido with TRT. He is administering self injections without difficulty. He says he obtained labs, but does not have copy. He says Dr Lemus, his PCP, does have them. Does report mild ED. He had good response to sildenafil in the past, but not bothered right now as ED is mild. No new  symptoms and other medical  issues remain unchanged from above. No hematuria, no dysuria, no frequency, no urgency, no hesitancy, no straining. No incontinence. No fevers, no chills, no nausea, no vomiting, no flank pain. \par \par 11/10/2020: Patient presents for follow up. He reports good control of fatigue and libido with TRT. No problems with injections. He is awaiting C spine surgery so has neck pain, but no new  symptoms and other medical  issues remain unchanged from above. No hematuria, no dysuria, no frequency, no urgency, no hesitancy, no straining. No incontinence. No fevers, no chills, no nausea, no vomiting, no flank pain. \par \par 05/11/2021: Patient presents for follow up. He reports fatigue, libido, and overall energy improved with TRT. he does report being worked up for "adrenal fatigue" thinking that he has been more tired due to stress and cortisol release but is vague with the details. Testosterone 1525, but patient says he obtained labs day after TRT injection, not 1 week after. Of note, he is injecting 300 mg qWeekly.

## 2021-05-11 NOTE — ASSESSMENT
[FreeTextEntry1] : 46 yo M treated with TRT for hypogonadism, with improvement of hypogonadal symptoms. Discussed repeating serum T day before next injection is due to better eval gilmar. If it remains very high, will recommend reducing dose. Otherwise will continue at current dose.

## 2021-05-20 ENCOUNTER — LABORATORY RESULT (OUTPATIENT)
Age: 46
End: 2021-05-20

## 2021-09-07 ENCOUNTER — APPOINTMENT (OUTPATIENT)
Dept: UROLOGY | Facility: CLINIC | Age: 46
End: 2021-09-07
Payer: MEDICARE

## 2021-09-07 PROCEDURE — 99213 OFFICE O/P EST LOW 20 MIN: CPT

## 2021-09-16 NOTE — HISTORY OF PRESENT ILLNESS
[FreeTextEntry1] : 44 year old man with complaint of fatigue, low libido, and depressed mood. He had been on TRT by [facility]. His dose was titrate to T cypionate 170 mcg/week IM. On this dose, a recent assay of serum testosterone showed 738. His symptoms began years ago. His symptoms improve on this regimen, but have not completely resolved. No suicidal ideation. No symptoms of LISBET. No blurry vision or headaches. He reports erectile dysfunction, but good erections with sildenafil 20 mg (40 mg in half.) He complains of history or premature ejaculation, but on sildenafil now he has delayed or no orgasm. He does not want to preserve fertility. \par Hypogonadal symptoms are moderate in severity. Nothing makes the symptoms better, nothing makes sx worse. It is associated with ED as described.\par No hematuria, no dysuria, no frequency, no urgency, no hesitancy, no straining. No incontinence. \par No fevers, no chills, no nausea, no vomiting, no flank pain. No family history contributory to hypogonadism, ED, premature ejaculation. \par \par 11/14/2019: Patient presents for follow up. He presents to discuss his dosage of testosterone. While he had previously stated that he had taken 170 mg, now he says he thinks he had taken 1.7 mL. Call placed to his previous provider, who confirmed he was getting 300 mg weekly, or 1.5 mL of 200 mg/mL. He reports hypogonadal and  symptoms and other medical  issues remain unchanged from above. No hematuria, no dysuria, no frequency, no urgency, no hesitancy, no straining. No incontinence. No fevers, no chills, no nausea, no vomiting, no flank pain. \par \par 06/04/2020: Patient presents for follow up. He reports good improvement of his fatigue, libido with TRT. He is administering self injections without difficulty. He says he obtained labs, but does not have copy. He says Dr Lemus, his PCP, does have them. Does report mild ED. He had good response to sildenafil in the past, but not bothered right now as ED is mild. No new  symptoms and other medical  issues remain unchanged from above. No hematuria, no dysuria, no frequency, no urgency, no hesitancy, no straining. No incontinence. No fevers, no chills, no nausea, no vomiting, no flank pain. \par \par 11/10/2020: Patient presents for follow up. He reports good control of fatigue and libido with TRT. No problems with injections. He is awaiting C spine surgery so has neck pain, but no new  symptoms and other medical  issues remain unchanged from above. No hematuria, no dysuria, no frequency, no urgency, no hesitancy, no straining. No incontinence. No fevers, no chills, no nausea, no vomiting, no flank pain. \par \par 05/11/2021: Patient presents for follow up. He reports fatigue, libido, and overall energy improved with TRT. he does report being worked up for "adrenal fatigue" thinking that he has been more tired due to stress and cortisol release but is vague with the details. Testosterone 1525, but patient says he obtained labs day after TRT injection, not 1 week after. Of note, he is injecting 300 mg qWeekly.\par \par 09/07/2021: Patient presents for follow up. Repeat serum T was 1000. Feels hypogonadal symptoms remain well controlled. However, complains of ED with poor tumescence, rigidity, and duration.

## 2021-09-16 NOTE — ASSESSMENT
[FreeTextEntry1] : 46 yo M treated with TRT for hypogonadism, with improvement of hypogonadal symptoms. Storm is high, but more acceptable. Will cautiously continue current dose. For ED, will rx sildenafil. Labs in 6 weeks.

## 2021-11-10 RX ORDER — SYRINGE, DISPOSABLE, 1 ML
1 ML SYRINGE, EMPTY DISPOSABLE MISCELLANEOUS
Qty: 12 | Refills: 3 | Status: DISCONTINUED | COMMUNITY
Start: 2019-10-31 | End: 2021-11-10

## 2021-11-22 ENCOUNTER — LABORATORY RESULT (OUTPATIENT)
Age: 46
End: 2021-11-22

## 2022-01-01 NOTE — PATIENT PROFILE ADULT. - FALL HARM RISK
CONSULT - LACTATION  Baby Boy Surya Wilman) Joanna 2 wk  o  male MRN: [id number]    [facility] NICU Room / Bed: NICU 26/NICU 26 Encounter: [id number]    Maternal Information     MOTHER:  Sofiya Syed  Maternal Age: 38 y o    OB History: # 1 - Date: None, Sex: None, Weight: None, GA: None, Delivery: None, Apgar1: None, Apgar5: None, Living: None, Birth Comments: None    # 2 - Date: 11/04/22, Sex: Male, Weight: None, GA: 28w6d, Delivery: Vaginal, Spontaneous, Apgar1: 8, Apgar5: 8, Living: Living, Birth Comments: None   Previouse breast reduction surgery? No    Lactation history:   Has patient previously breast fed: No   How long had patient previously breast fed:     Previous breast feeding complications:       Past Surgical History:   Procedure Laterality Date   • COLPOSCOPY     • COLPOSCOPY W/ BIOPSY / CURETTAGE  02/25/2010    Colpsocopy Cervix with Biopsy(S) with Endocervical Curettage; On 2/25/10, colonoscopy done with changes of mild dysplasia noted at 1:00, 5:00 and 11:00  ECC was negative  Also done on 3/13/07 with negative findings at 12:00, 1:00 and 5:00 to ECC showed acute and chronic cervicitis with squamous metaplasia with a detached fragment showing dysplastic features  Managed by; Rosemary Dobbs; Resolved - 02   • DILATION AND CURETTAGE OF UTERUS     • PA HYSTEROSCOPY,W/ENDO BX N/A 3/11/2021    Procedure: HYSTEROSCOPY; CERVICAL DILATION; POLYPECTOMY; BIOPSY (MYOSURE); Surgeon: Mitzi Valentine DO;  Location: [facility];  Service: Gynecology   • TONSILLECTOMY     • TOOTH EXTRACTION          Birth information:  YOB: 2023   Time of birth: 9:04 AM   Sex: male   Delivery type: Vaginal, Spontaneous   Birth Weight: 1674 g (3 lb 11 oz)   Percent of Weight Change: 12%     Gestational Age: 30w11d   [unfilled]    Assessment     Breast and nipple assessment: Left breast presents with full glandular tissue that is firm upon palpation   Nipples leak with stimulation of the breast  Right breast is visibly red in the upper, outer quadrants  With palpation, tight dermis, edema noted  Right nipple has scab on nipple face between the 12-1 on a clock face  [place] Assessment: no clinical assessment    Feeding assessment: no clinical assessment  LATCH:  Latch: Audible Swallowing:     Type of Nipple:     Comfort (Breast/Nipple):     Hold (Positioning):     LATCH Score:            Feeding recommendations:  consult request due to possible breast infection  Education provided on signs of infection,, when to pump, how to pump, timing of pump, and how to empty the breasts to comfort  Education on large flange size  Provided 28 5 mm flange  And throw-away pads  Education on timing of pumping, timing between pumping and how to use hands to assist in expressing and transferring milk  Reviewed how to establish milk supply  Reviewed symptoms of engorgement and mastitis  Encouraged mom to use use packs provided to reduce swelling between pumping sessions and to use a warm compress to stimulate expression  Enc  To call lactation or OBGYN if symptoms get worse    Discussed s/s engorgement, blocked milk ducts, and mastitis  Discussed how to remedy at home and when to contact physician  Reviewed engorgement and ways to reduce symptoms  Use a warmth on breasts with massage prior to feeding / pumping  Use massage during pumping over full ducts  Used cold, compression on breasts after feeding/pumping session  Ideas are rice in a sock  Place one in the freezer for cool and one in the microwave for warmth  Referred to discharge book / engorgement page  Pumping:   - When pumping, begin in stimulation mode (high cycle, low vacuum) until milk begins to express  Change pump to expression mode (low cycle, high vacuum)  Use hands on pumping techniques to assist with milk transfer  When milk stops expressing, change back to stimulation mode   When milk begins to flow, change to expression mode  You make cycle pump up to three times in a pumping session  Milk Supply:   - Allow for non-nutritive suck at the breast to stimulate supply   - Allow for skin to skin during and after each breastfeeding session   - Use massage, heat, and hand expression prior to feedings to assist with deep latch   - Increase pumping sessions and pump after every feeding    Encouraged parents to call for assistance, questions, and concerns about breastfeeding  Extension provided        Art Knapp 2022 11:55 AM surgery

## 2022-02-02 ENCOUNTER — TRANSCRIPTION ENCOUNTER (OUTPATIENT)
Age: 47
End: 2022-02-02

## 2022-02-10 ENCOUNTER — TRANSCRIPTION ENCOUNTER (OUTPATIENT)
Age: 47
End: 2022-02-10

## 2022-02-15 ENCOUNTER — TRANSCRIPTION ENCOUNTER (OUTPATIENT)
Age: 47
End: 2022-02-15

## 2022-02-21 ENCOUNTER — TRANSCRIPTION ENCOUNTER (OUTPATIENT)
Age: 47
End: 2022-02-21

## 2022-04-19 NOTE — PATIENT PROFILE ADULT. - BLOOD TRANSFUSION, PREVIOUS, PROFILE
no Acitretin Counseling:  I discussed with the patient the risks of acitretin including but not limited to hair loss, dry lips/skin/eyes, liver damage, hyperlipidemia, depression/suicidal ideation, photosensitivity.  Serious rare side effects can include but are not limited to pancreatitis, pseudotumor cerebri, bony changes, clot formation/stroke/heart attack.  Patient understands that alcohol is contraindicated since it can result in liver toxicity and significantly prolong the elimination of the drug by many years.

## 2022-05-30 NOTE — PATIENT PROFILE ADULT. - NSTOBACCONEVERSMOKERY/N_GEN_A
83 yo F w/ hx HTN, CHF, COPD, afib on eliquis p/w found with SOB  copd  CHF  AF  Obesity  OP  OA  Covid    on ABX  on RA  VS noted  CM following  plan for KRISTA DC  Sonata added for Sleep Aid    s/p BIPAP  GOC discussion - Lela Haney - HCP - Daughter - pt is DNR DNI - MOLST updated and signed -   assist with needs  Isolation for covid, Remdesivir -   monitor VS and HD and Sat  cvs rx regimen Yes

## 2022-06-22 NOTE — ASU PREOP CHECKLIST - HEIGHT IN INCHES
1 Render Risk Assessment In Note?: no Additional Notes: There is medical necessity for the surgery/procedure performed because without performing it immediately, the patient is at risk for serious adverse medical consequences, including, but not limited to, rapid growth of the tumor, pain, metastasis, and morbidity.\\n\\nDue to the presence of a pathologically diagnosed skin cancer, the patient's mental awareness and anxiety as it relates specifically to this cancer on her/his body should be considered in the decision to immediately perform this procedure.\\n\\Rohit this case, progression of the cancer (disease progression) can lead to a change in reconstructive technique and potentially add to permanent disfigurement.\\nWe do not deplete hospital beds and PPE when Mohs and surgical outpatient procedures are done per our common practice. Detail Level: Simple

## 2022-07-01 ENCOUNTER — NON-APPOINTMENT (OUTPATIENT)
Age: 47
End: 2022-07-01

## 2022-07-20 LAB
BASOPHILS # BLD AUTO: 0.07 K/UL
BASOPHILS NFR BLD AUTO: 1.1 %
EOSINOPHIL # BLD AUTO: 0.13 K/UL
EOSINOPHIL NFR BLD AUTO: 2.1 %
HCT VFR BLD CALC: 48.7 %
HGB BLD-MCNC: 16.3 G/DL
IMM GRANULOCYTES NFR BLD AUTO: 0.2 %
LYMPHOCYTES # BLD AUTO: 2.07 K/UL
LYMPHOCYTES NFR BLD AUTO: 33 %
MAN DIFF?: NORMAL
MCHC RBC-ENTMCNC: 31.5 PG
MCHC RBC-ENTMCNC: 33.5 GM/DL
MCV RBC AUTO: 94.2 FL
MONOCYTES # BLD AUTO: 0.55 K/UL
MONOCYTES NFR BLD AUTO: 8.8 %
NEUTROPHILS # BLD AUTO: 3.45 K/UL
NEUTROPHILS NFR BLD AUTO: 54.8 %
PLATELET # BLD AUTO: 202 K/UL
RBC # BLD: 5.17 M/UL
RBC # FLD: 12.8 %
TESTOST FREE SERPL-MCNC: 2.1 PG/ML
TESTOST SERPL-MCNC: 110 NG/DL
WBC # FLD AUTO: 6.28 K/UL

## 2022-11-22 ENCOUNTER — APPOINTMENT (OUTPATIENT)
Dept: UROLOGY | Facility: CLINIC | Age: 47
End: 2022-11-22

## 2022-11-22 VITALS
DIASTOLIC BLOOD PRESSURE: 80 MMHG | HEIGHT: 74 IN | BODY MASS INDEX: 27.59 KG/M2 | OXYGEN SATURATION: 98 % | HEART RATE: 80 BPM | WEIGHT: 215 LBS | SYSTOLIC BLOOD PRESSURE: 130 MMHG

## 2022-11-22 DIAGNOSIS — N52.9 MALE ERECTILE DYSFUNCTION, UNSPECIFIED: ICD-10-CM

## 2022-11-22 PROCEDURE — 99213 OFFICE O/P EST LOW 20 MIN: CPT

## 2022-11-22 NOTE — ASSESSMENT
[FreeTextEntry1] : 44 yo M treated with TRT for hypogonadism, with improvement of hypogonadal symptoms. Will continue TRT. Labs due 1/2023. For ED, continue sildenafil PRN.

## 2022-11-22 NOTE — HISTORY OF PRESENT ILLNESS
[FreeTextEntry1] : 44 year old man with complaint of fatigue, low libido, and depressed mood. He had been on TRT by [facility]. His dose was titrate to T cypionate 170 mcg/week IM. On this dose, a recent assay of serum testosterone showed 738. His symptoms began years ago. His symptoms improve on this regimen, but have not completely resolved. No suicidal ideation. No symptoms of LISBET. No blurry vision or headaches. He reports erectile dysfunction, but good erections with sildenafil 20 mg (40 mg in half.) He complains of history or premature ejaculation, but on sildenafil now he has delayed or no orgasm. He does not want to preserve fertility. \par Hypogonadal symptoms are moderate in severity. Nothing makes the symptoms better, nothing makes sx worse. It is associated with ED as described.\par No hematuria, no dysuria, no frequency, no urgency, no hesitancy, no straining. No incontinence. \par No fevers, no chills, no nausea, no vomiting, no flank pain. No family history contributory to hypogonadism, ED, premature ejaculation. \par \par 11/14/2019: Patient presents for follow up. He presents to discuss his dosage of testosterone. While he had previously stated that he had taken 170 mg, now he says he thinks he had taken 1.7 mL. Call placed to his previous provider, who confirmed he was getting 300 mg weekly, or 1.5 mL of 200 mg/mL. He reports hypogonadal and  symptoms and other medical  issues remain unchanged from above. No hematuria, no dysuria, no frequency, no urgency, no hesitancy, no straining. No incontinence. No fevers, no chills, no nausea, no vomiting, no flank pain. \par \par 06/04/2020: Patient presents for follow up. He reports good improvement of his fatigue, libido with TRT. He is administering self injections without difficulty. He says he obtained labs, but does not have copy. He says Dr Lemus, his PCP, does have them. Does report mild ED. He had good response to sildenafil in the past, but not bothered right now as ED is mild. No new  symptoms and other medical  issues remain unchanged from above. No hematuria, no dysuria, no frequency, no urgency, no hesitancy, no straining. No incontinence. No fevers, no chills, no nausea, no vomiting, no flank pain. \par \par 11/10/2020: Patient presents for follow up. He reports good control of fatigue and libido with TRT. No problems with injections. He is awaiting C spine surgery so has neck pain, but no new  symptoms and other medical  issues remain unchanged from above. No hematuria, no dysuria, no frequency, no urgency, no hesitancy, no straining. No incontinence. No fevers, no chills, no nausea, no vomiting, no flank pain. \par \par 05/11/2021: Patient presents for follow up. He reports fatigue, libido, and overall energy improved with TRT. he does report being worked up for "adrenal fatigue" thinking that he has been more tired due to stress and cortisol release but is vague with the details. Testosterone 1525, but patient says he obtained labs day after TRT injection, not 1 week after. Of note, he is injecting 300 mg qWeekly.\par \par 09/07/2021: Patient presents for follow up. Repeat serum T was 1000. Feels hypogonadal symptoms remain well controlled. However, complains of ED with poor tumescence, rigidity, and duration. \par \par 11/22/2022: Patient presents for follow up. He reports erections have improved. SOme delayed orgasm since starting lexapro, but managing. Good control of hypogonadal symptoms with TRT. no complaints.

## 2023-02-20 ENCOUNTER — EMERGENCY (EMERGENCY)
Facility: HOSPITAL | Age: 48
LOS: 0 days | Discharge: HOME | End: 2023-02-20
Attending: EMERGENCY MEDICINE
Payer: MEDICARE

## 2023-02-20 VITALS — WEIGHT: 220.02 LBS | HEIGHT: 74 IN

## 2023-02-20 VITALS
DIASTOLIC BLOOD PRESSURE: 82 MMHG | HEART RATE: 71 BPM | SYSTOLIC BLOOD PRESSURE: 145 MMHG | OXYGEN SATURATION: 96 % | RESPIRATION RATE: 18 BRPM

## 2023-02-20 DIAGNOSIS — M54.9 DORSALGIA, UNSPECIFIED: ICD-10-CM

## 2023-02-20 DIAGNOSIS — Z92.241 PERSONAL HISTORY OF SYSTEMIC STEROID THERAPY: Chronic | ICD-10-CM

## 2023-02-20 DIAGNOSIS — S61.012A LACERATION WITHOUT FOREIGN BODY OF LEFT THUMB WITHOUT DAMAGE TO NAIL, INITIAL ENCOUNTER: ICD-10-CM

## 2023-02-20 DIAGNOSIS — Z98.1 ARTHRODESIS STATUS: Chronic | ICD-10-CM

## 2023-02-20 DIAGNOSIS — G89.29 OTHER CHRONIC PAIN: ICD-10-CM

## 2023-02-20 DIAGNOSIS — Z98.890 OTHER SPECIFIED POSTPROCEDURAL STATES: Chronic | ICD-10-CM

## 2023-02-20 DIAGNOSIS — Z88.6 ALLERGY STATUS TO ANALGESIC AGENT: ICD-10-CM
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
## 2023-02-20 DIAGNOSIS — Z23 ENCOUNTER FOR IMMUNIZATION: ICD-10-CM
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
## 2023-02-20 PROCEDURE — 90471 IMMUNIZATION ADMIN: CPT

## 2023-02-20 PROCEDURE — 99284 EMERGENCY DEPT VISIT MOD MDM: CPT | Mod: 25

## 2023-02-20 PROCEDURE — 12001 RPR S/N/AX/GEN/TRNK 2.5CM/<: CPT

## 2023-02-20 PROCEDURE — 99283 EMERGENCY DEPT VISIT LOW MDM: CPT | Mod: 25

## 2023-02-20 PROCEDURE — 73140 X-RAY EXAM OF FINGER(S): CPT | Mod: LT

## 2023-02-20 PROCEDURE — 90715 TDAP VACCINE 7 YRS/> IM: CPT

## 2023-02-20 PROCEDURE — 73140 X-RAY EXAM OF FINGER(S): CPT | Mod: 26,LT

## 2023-02-20 RX ORDER — TETANUS TOXOID, REDUCED DIPHTHERIA TOXOID AND ACELLULAR PERTUSSIS VACCINE, ADSORBED 5; 2.5; 8; 8; 2.5 [IU]/.5ML; [IU]/.5ML; UG/.5ML; UG/.5ML; UG/.5ML
0.5 SUSPENSION INTRAMUSCULAR ONCE
Refills: 0 | Status: COMPLETED | OUTPATIENT
Start: 2023-02-20 | End: 2023-02-20

## 2023-02-20 RX ADMIN — TETANUS TOXOID, REDUCED DIPHTHERIA TOXOID AND ACELLULAR PERTUSSIS VACCINE, ADSORBED 0.5 MILLILITER(S): 5; 2.5; 8; 8; 2.5 SUSPENSION INTRAMUSCULAR at 19:36

## 2023-02-20 NOTE — ED STATDOCS - CLINICAL SUMMARY MEDICAL DECISION MAKING FREE TEXT BOX
XR unremarkable.  Tdap updated, laceration repaired.  D/c home with supportive care, f/u for suture removal.

## 2023-02-20 NOTE — ED ADULT TRIAGE NOTE - CHIEF COMPLAINT QUOTE
Pt presents to the ED c/o laceration to the left thumb. Pt states he cut his finger on a chisel. Gauze in place, bleeding controlled. Unknown last tetanus.

## 2023-02-20 NOTE — ED STATDOCS - NSFOLLOWUPINSTRUCTIONS_ED_ALL_ED_FT
keep dry and covered for 24 hours then wash the area everyday  watch for infection  tylenol for pain   sutures removed in 1o days

## 2023-02-20 NOTE — ED STATDOCS - SKIN NEGATIVE STATEMENT, MLM
Detail Level: Detailed
Patient Specific Counseling (Will Not Stick From Patient To Patient): Can not identify the site will continue to observe for reoccurrence. Patient understands and knows what to look for.
laceration left thumb

## 2023-02-20 NOTE — ED STATDOCS - PATIENT PORTAL LINK FT
You can access the FollowMyHealth Patient Portal offered by [facility] by registering at the following website: [email or web address]. By joining [facility]’s FollowMyHealth portal, you will also be able to view your health information using other applications (apps) compatible with our system.

## 2023-02-20 NOTE — ED STATDOCS - PROGRESS NOTE DETAILS
pt seen with ER attending for laceration to his left thumb form jose denies sensory loss or ROM loss needing suturing

## 2023-02-20 NOTE — ED STATDOCS - OBJECTIVE STATEMENT
48 yo M hx chronic back pain, presents with CC of left thumb laceration.  Patient was using a chisel, and sustained laceration to left thumb.  C/o bleeding and pain at area.  Denies any other symptoms or injuries.  Unknown last Tdap.

## 2023-03-31 ENCOUNTER — NON-APPOINTMENT (OUTPATIENT)
Age: 48
End: 2023-03-31

## 2023-04-10 LAB
BASOPHILS # BLD AUTO: 0.11 K/UL
BASOPHILS NFR BLD AUTO: 1.7 %
EOSINOPHIL # BLD AUTO: 0.18 K/UL
EOSINOPHIL NFR BLD AUTO: 2.8 %
HCT VFR BLD CALC: 49.8 %
HGB BLD-MCNC: 16.5 G/DL
IMM GRANULOCYTES NFR BLD AUTO: 0.3 %
LYMPHOCYTES # BLD AUTO: 2.46 K/UL
LYMPHOCYTES NFR BLD AUTO: 38.7 %
MAN DIFF?: NORMAL
MCHC RBC-ENTMCNC: 31.6 PG
MCHC RBC-ENTMCNC: 33.1 GM/DL
MCV RBC AUTO: 95.4 FL
MONOCYTES # BLD AUTO: 0.72 K/UL
MONOCYTES NFR BLD AUTO: 11.3 %
NEUTROPHILS # BLD AUTO: 2.87 K/UL
NEUTROPHILS NFR BLD AUTO: 45.2 %
PLATELET # BLD AUTO: 183 K/UL
PSA SERPL-MCNC: 0.89 NG/ML
RBC # BLD: 5.22 M/UL
RBC # FLD: 12.3 %
WBC # FLD AUTO: 6.36 K/UL

## 2023-04-12 LAB
TESTOST FREE SERPL-MCNC: 7.9 PG/ML
TESTOST SERPL-MCNC: 347 NG/DL

## 2023-06-13 ENCOUNTER — APPOINTMENT (OUTPATIENT)
Dept: DERMATOLOGY | Facility: CLINIC | Age: 48
End: 2023-06-13

## 2023-07-11 ENCOUNTER — APPOINTMENT (OUTPATIENT)
Dept: UROLOGY | Facility: CLINIC | Age: 48
End: 2023-07-11
Payer: MEDICARE

## 2023-07-11 PROCEDURE — 99213 OFFICE O/P EST LOW 20 MIN: CPT

## 2023-07-11 RX ORDER — SILDENAFIL 20 MG/1
20 TABLET ORAL
Qty: 30 | Refills: 11 | Status: ACTIVE | COMMUNITY
Start: 2021-09-07 | End: 1900-01-01

## 2023-07-11 NOTE — ASSESSMENT
[FreeTextEntry1] : 46 yo M treated with TRT for hypogonadism, with improvement of hypogonadal symptoms previously. Now with mood, libido, and erection problems worsening. WIll repeat serum T now. Will consider increasing to 200 twice weekly if T level low. For ED, will renew sildenafil.

## 2023-07-11 NOTE — HISTORY OF PRESENT ILLNESS
[FreeTextEntry1] : 44 year old man with complaint of fatigue, low libido, and depressed mood. He had been on TRT by [facility]. His dose was titrate to T cypionate 170 mcg/week IM. On this dose, a recent assay of serum testosterone showed 738. His symptoms began years ago. His symptoms improve on this regimen, but have not completely resolved. No suicidal ideation. No symptoms of LISBET. No blurry vision or headaches. He reports erectile dysfunction, but good erections with sildenafil 20 mg (40 mg in half.) He complains of history or premature ejaculation, but on sildenafil now he has delayed or no orgasm. He does not want to preserve fertility. \par Hypogonadal symptoms are moderate in severity. Nothing makes the symptoms better, nothing makes sx worse. It is associated with ED as described.\par No hematuria, no dysuria, no frequency, no urgency, no hesitancy, no straining. No incontinence. \par No fevers, no chills, no nausea, no vomiting, no flank pain. No family history contributory to hypogonadism, ED, premature ejaculation. \par \par 11/14/2019: Patient presents for follow up. He presents to discuss his dosage of testosterone. While he had previously stated that he had taken 170 mg, now he says he thinks he had taken 1.7 mL. Call placed to his previous provider, who confirmed he was getting 300 mg weekly, or 1.5 mL of 200 mg/mL. He reports hypogonadal and  symptoms and other medical  issues remain unchanged from above. No hematuria, no dysuria, no frequency, no urgency, no hesitancy, no straining. No incontinence. No fevers, no chills, no nausea, no vomiting, no flank pain. \par \par 06/04/2020: Patient presents for follow up. He reports good improvement of his fatigue, libido with TRT. He is administering self injections without difficulty. He says he obtained labs, but does not have copy. He says Dr Lemus, his PCP, does have them. Does report mild ED. He had good response to sildenafil in the past, but not bothered right now as ED is mild. No new  symptoms and other medical  issues remain unchanged from above. No hematuria, no dysuria, no frequency, no urgency, no hesitancy, no straining. No incontinence. No fevers, no chills, no nausea, no vomiting, no flank pain. \par \par 11/10/2020: Patient presents for follow up. He reports good control of fatigue and libido with TRT. No problems with injections. He is awaiting C spine surgery so has neck pain, but no new  symptoms and other medical  issues remain unchanged from above. No hematuria, no dysuria, no frequency, no urgency, no hesitancy, no straining. No incontinence. No fevers, no chills, no nausea, no vomiting, no flank pain. \par \par 05/11/2021: Patient presents for follow up. He reports fatigue, libido, and overall energy improved with TRT. he does report being worked up for "adrenal fatigue" thinking that he has been more tired due to stress and cortisol release but is vague with the details. Testosterone 1525, but patient says he obtained labs day after TRT injection, not 1 week after. Of note, he is injecting 300 mg qWeekly.\par \par 09/07/2021: Patient presents for follow up. Repeat serum T was 1000. Feels hypogonadal symptoms remain well controlled. However, complains of ED with poor tumescence, rigidity, and duration. \par \par 11/22/2022: Patient presents for follow up. He reports erections have improved. SOme delayed orgasm since starting lexapro, but managing. Good control of hypogonadal symptoms with TRT. no complaints.\par \par 07/11/2023: Patient presents for follow up. He reports mood and libido have decreased. Rx is for 300 weekly, but he has split the dose to 150 twice weekly to reduce peaks and troughs. Also erections duration has decreased. He ran out of sildenafil. serum T was 350 in 4/2023

## 2023-07-13 LAB
TESTOST FREE SERPL-MCNC: 41.3 PG/ML
TESTOST SERPL-MCNC: 1226 NG/DL

## 2023-08-01 ENCOUNTER — APPOINTMENT (OUTPATIENT)
Dept: DERMATOLOGY | Facility: CLINIC | Age: 48
End: 2023-08-01

## 2023-08-09 RX ORDER — SYRINGE,SAFETY WITH NEEDLE,3ML 22GX1 1/2"
22G X 1-1/2" SYRINGE, EMPTY DISPOSABLE MISCELLANEOUS
Qty: 20 | Refills: 3 | Status: ACTIVE | COMMUNITY
Start: 2019-12-19 | End: 1900-01-01

## 2023-08-14 ENCOUNTER — NON-APPOINTMENT (OUTPATIENT)
Age: 48
End: 2023-08-14

## 2023-08-14 ENCOUNTER — APPOINTMENT (OUTPATIENT)
Dept: ORTHOPEDIC SURGERY | Facility: CLINIC | Age: 48
End: 2023-08-14
Payer: MEDICARE

## 2023-08-14 DIAGNOSIS — M23.91 UNSPECIFIED INTERNAL DERANGEMENT OF RIGHT KNEE: ICD-10-CM

## 2023-08-14 DIAGNOSIS — M25.562 PAIN IN LEFT KNEE: ICD-10-CM

## 2023-08-14 DIAGNOSIS — M23.92 UNSPECIFIED INTERNAL DERANGEMENT OF LEFT KNEE: ICD-10-CM

## 2023-08-14 PROCEDURE — 20610 DRAIN/INJ JOINT/BURSA W/O US: CPT | Mod: LT

## 2023-08-14 PROCEDURE — 99204 OFFICE O/P NEW MOD 45 MIN: CPT | Mod: 25

## 2023-08-14 PROCEDURE — 73564 X-RAY EXAM KNEE 4 OR MORE: CPT | Mod: 50

## 2023-08-14 NOTE — DISCUSSION/SUMMARY
[de-identified] : Left more than right knee internal derangement  Extensive discussion of the natural history of this issue was had with the patient.  We discussed the treatment options focusing on conservative therapy which includes anti-inflammatories, physical therapy/home exercise, & activity modification.   Patient elected for steroid injection left knee today. Recommend Voltaren gel as he would like to avoid NSAIDs.  Tylenol as needed. Patient will return if the pain returns or does not resolve.  We discussed when to consider MRIs at this time.  The patient's current medication management of their orthopedic diagnosis was reviewed today: (1) We discussed a comprehensive treatment plan that included possible pharmaceutical management involving the use of prescription strength medications including but not limited to options such as oral Ibuprofen 400mg QID, once daily Meloxicam 15 mg, or 500-650 mg Tylenol versus over the counter oral medications and topical prescription NSAID Pennsaid vs over the counter Voltaren gel. (2) There is a moderate risk of morbidity with further treatment, especially from use of prescription strength medications and possible side effects of these medications which include upset stomach with oral medications, skin reactions to topical medications and cardiac/renal issues with long term use. (3) I recommended that the patient follow-up with their medical physician to discuss any significant specific potential issues with long term medication use such as interactions with current medications or with exacerbation of underlying medical comorbidities. (4) The benefits and risks associated with use of injectable, oral or topical, prescription and over the counter anti-inflammatory medications were discussed with the patient. The patient voiced understanding of the risks including but not limited to bleeding, stroke, kidney dysfunction, heart disease, and were referred to the black box warning label for further information.

## 2023-08-14 NOTE — PROCEDURE
[de-identified] : 8/14/23: Left knee injection - Steroid The risks, benefits, and alternatives of the injection were reviewed/discussed with the patient today in office and all of their questions were answered. We discussed possible side effects and efficacy of this injection.  The patient consented to the procedure.  Site and side were verified with the patient.  The inferolateral injection site was prepped with chloroprep.  Cold spray was utilized to numb the skin. Utilizing sterile technique, the knee was injected with 1 ml 40 mg methylprednisolone, 4 ml 1% Lidocaine, 5 mL 0.25% Bupivicaine. A sterile bandage was applied. The patient tolerated the procedure well and there were no complications.

## 2023-08-14 NOTE — PHYSICAL EXAM
[de-identified] : General Appearance: normal without acute distress Mental: Alert and oriented x 3 Psych/affect: appropriate, cooperative Gait: Mildly antalgic gait  Left lower extremity Hip: Normal ROM without pain on IR/ER Knee Inspection: no effusion, no erythema. Wounds: None. Alignment: neutral. Palpation: Tender to palpation medial joint line. ROM active (in degrees): 0-140, pain with deep flexion Ligamentous laxity: stable to varus stress test, stable to valgus stress test Meniscal Test: Positive McMurrays, positive Aziza. Muscle Test: good quad strength.  Right lower extremity Hip: Normal ROM without pain on IR/ER Knee Inspection: no effusion Wounds: none Alignment: Neutral Palpation: No specific tenderness on palpation. ROM active (in degrees): 0-140 without crepitus or pain through the arc of motion Ligamentous laxity: stable to varus stress test, stable to valgus stress test Meniscal Test: Positive McMurrays, negative Aziza Muscle Test: good quad strength [de-identified] : 8/14/23: Bilateral knee xrays, standing AP/Lateral and Merchant films, and 45 degree PA standing view taken today in the office are reviewed and demonstrate mildly decreased medial joint space and patellofemoral joint space

## 2023-08-14 NOTE — HISTORY OF PRESENT ILLNESS
[de-identified] : 8/14/23: Patient presents with bilateral knee pain left worse than right.  Is been bothering for about a year most on the medial aspect of the knee however the left has been bothering last few months.  Left is mostly on the medial aspect of the knee.  Also some pain suprapatella.  Has trouble getting up from a chair.  Does have some buckling.  Denies radiating pain or numbness tingling.  Avoids NSAIDs as he takes Ultram for his back and neck 3 times a day.  States he was taking high doses of ibuprofen in the past but stopped that so he tries to avoid those.  Does report having his right knee drained in the past.  Denies anticoagulation tobacco use past medical history, allergies to codeine

## 2023-08-17 LAB
HCT VFR BLD CALC: 47.2 %
HGB BLD-MCNC: 16.2 G/DL
TESTOST FREE SERPL-MCNC: 44.6 PG/ML
TESTOST SERPL-MCNC: >1500 NG/DL

## 2023-11-01 LAB
TESTOST FREE SERPL-MCNC: 4.5 PG/ML
TESTOST SERPL-MCNC: 224 NG/DL

## 2023-11-21 ENCOUNTER — APPOINTMENT (OUTPATIENT)
Dept: UROLOGY | Facility: CLINIC | Age: 48
End: 2023-11-21
Payer: MEDICARE

## 2023-11-21 VITALS
HEART RATE: 71 BPM | SYSTOLIC BLOOD PRESSURE: 121 MMHG | BODY MASS INDEX: 28.23 KG/M2 | OXYGEN SATURATION: 97 % | DIASTOLIC BLOOD PRESSURE: 75 MMHG | WEIGHT: 220 LBS | HEIGHT: 74 IN

## 2023-11-21 PROCEDURE — 99213 OFFICE O/P EST LOW 20 MIN: CPT

## 2023-12-30 ENCOUNTER — NON-APPOINTMENT (OUTPATIENT)
Age: 48
End: 2023-12-30

## 2024-01-03 ENCOUNTER — NON-APPOINTMENT (OUTPATIENT)
Age: 49
End: 2024-01-03

## 2024-03-25 LAB
HCT VFR BLD CALC: 44.1 %
HGB BLD-MCNC: 14.6 G/DL
PSA SERPL-MCNC: 0.81 NG/ML

## 2024-04-09 LAB
TESTOST FREE SERPL-MCNC: 18.6 PG/ML
TESTOST SERPL-MCNC: 978 NG/DL

## 2024-05-09 ENCOUNTER — APPOINTMENT (OUTPATIENT)
Dept: UROLOGY | Facility: CLINIC | Age: 49
End: 2024-05-09
Payer: MEDICARE

## 2024-05-09 VITALS
SYSTOLIC BLOOD PRESSURE: 119 MMHG | HEIGHT: 74 IN | BODY MASS INDEX: 27.59 KG/M2 | HEART RATE: 67 BPM | DIASTOLIC BLOOD PRESSURE: 70 MMHG | WEIGHT: 215 LBS

## 2024-05-09 DIAGNOSIS — E29.1 TESTICULAR HYPOFUNCTION: ICD-10-CM

## 2024-05-09 PROCEDURE — 99212 OFFICE O/P EST SF 10 MIN: CPT

## 2024-05-09 NOTE — HISTORY OF PRESENT ILLNESS
[FreeTextEntry1] : 44 year old man with complaint of fatigue, low libido, and depressed mood. He had been on TRT by [facility]. His dose was titrate to T cypionate 170 mcg/week IM. On this dose, a recent assay of serum testosterone showed 738. His symptoms began years ago. His symptoms improve on this regimen, but have not completely resolved. No suicidal ideation. No symptoms of LISBET. No blurry vision or headaches. He reports erectile dysfunction, but good erections with sildenafil 20 mg (40 mg in half.) He complains of history or premature ejaculation, but on sildenafil now he has delayed or no orgasm. He does not want to preserve fertility.  Hypogonadal symptoms are moderate in severity. Nothing makes the symptoms better, nothing makes sx worse. It is associated with ED as described. No hematuria, no dysuria, no frequency, no urgency, no hesitancy, no straining. No incontinence.  No fevers, no chills, no nausea, no vomiting, no flank pain. No family history contributory to hypogonadism, ED, premature ejaculation.   11/14/2019: Patient presents for follow up. He presents to discuss his dosage of testosterone. While he had previously stated that he had taken 170 mg, now he says he thinks he had taken 1.7 mL. Call placed to his previous provider, who confirmed he was getting 300 mg weekly, or 1.5 mL of 200 mg/mL. He reports hypogonadal and  symptoms and other medical  issues remain unchanged from above. No hematuria, no dysuria, no frequency, no urgency, no hesitancy, no straining. No incontinence. No fevers, no chills, no nausea, no vomiting, no flank pain.   06/04/2020: Patient presents for follow up. He reports good improvement of his fatigue, libido with TRT. He is administering self injections without difficulty. He says he obtained labs, but does not have copy. He says Dr Lemus, his PCP, does have them. Does report mild ED. He had good response to sildenafil in the past, but not bothered right now as ED is mild. No new  symptoms and other medical  issues remain unchanged from above. No hematuria, no dysuria, no frequency, no urgency, no hesitancy, no straining. No incontinence. No fevers, no chills, no nausea, no vomiting, no flank pain.   11/10/2020: Patient presents for follow up. He reports good control of fatigue and libido with TRT. No problems with injections. He is awaiting C spine surgery so has neck pain, but no new  symptoms and other medical  issues remain unchanged from above. No hematuria, no dysuria, no frequency, no urgency, no hesitancy, no straining. No incontinence. No fevers, no chills, no nausea, no vomiting, no flank pain.   05/11/2021: Patient presents for follow up. He reports fatigue, libido, and overall energy improved with TRT. he does report being worked up for "adrenal fatigue" thinking that he has been more tired due to stress and cortisol release but is vague with the details. Testosterone 1525, but patient says he obtained labs day after TRT injection, not 1 week after. Of note, he is injecting 300 mg qWeekly.  09/07/2021: Patient presents for follow up. Repeat serum T was 1000. Feels hypogonadal symptoms remain well controlled. However, complains of ED with poor tumescence, rigidity, and duration.   11/22/2022: Patient presents for follow up. He reports erections have improved. SOme delayed orgasm since starting lexapro, but managing. Good control of hypogonadal symptoms with TRT. no complaints.  07/11/2023: Patient presents for follow up. He reports mood and libido have decreased. Rx is for 300 weekly, but he has split the dose to 150 twice weekly to reduce peaks and troughs. Also erections duration has decreased. He ran out of sildenafil. serum T was 350 in 4/2023 11/21/2023: Patient presents for follow up. He reports due to his elevated testosterone level of >1500 in 8/2023 patient reduced his TRT to 50 mg twice a week but notes low libido, weakness and feels tired. His repeat testosterone in 10/2023 is noted to be 224. No new  complaints. Had RICAROD with PCP this year.  05/09/2024: Patient presents for follow up. He reports comfortable on current dose of TRT. Good energy, libido. Serum T 978,

## 2024-05-09 NOTE — ASSESSMENT
[FreeTextEntry1] : 47 yo M treated with TRT for hypogonadism, with improvement of hypogonadal symptoms. Serum T a little elevated, but acceptable. WIll continue current regimen. PSA and H/H unremarkable. RTO in 09/2024 with surveillance labs.

## 2024-05-21 RX ORDER — TESTOSTERONE CYPIONATE 200 MG/ML
200 INJECTION, SOLUTION INTRAMUSCULAR
Qty: 20 | Refills: 0 | Status: ACTIVE | COMMUNITY
Start: 2019-10-31 | End: 1900-01-01

## 2024-05-21 RX ORDER — SYRINGE WITH NEEDLE, 1 ML 25GX5/8"
23G X 1" SYRINGE, EMPTY DISPOSABLE MISCELLANEOUS
Qty: 20 | Refills: 3 | Status: ACTIVE | COMMUNITY
Start: 2019-10-31 | End: 1900-01-01

## 2024-07-10 ENCOUNTER — RX RENEWAL (OUTPATIENT)
Age: 49
End: 2024-07-10

## 2024-08-07 ENCOUNTER — APPOINTMENT (OUTPATIENT)
Dept: INTERNAL MEDICINE | Facility: CLINIC | Age: 49
End: 2024-08-07

## 2024-08-07 PROBLEM — G47.00 INSOMNIA: Status: ACTIVE | Noted: 2024-08-07

## 2024-08-07 PROBLEM — G47.33 OBSTRUCTIVE SLEEP APNEA HYPOPNEA, SEVERE: Status: ACTIVE | Noted: 2024-08-07

## 2024-08-07 PROCEDURE — ZZZZZ: CPT

## 2024-08-07 PROCEDURE — 94060 EVALUATION OF WHEEZING: CPT

## 2024-08-07 PROCEDURE — 99205 OFFICE O/P NEW HI 60 MIN: CPT | Mod: 25

## 2024-08-07 PROCEDURE — 94729 DIFFUSING CAPACITY: CPT

## 2024-08-07 PROCEDURE — 94727 GAS DIL/WSHOT DETER LNG VOL: CPT

## 2024-08-07 NOTE — PHYSICAL EXAM
[TextEntry] : Constitutional: no acute distress   HEENT: normal oropharynx, Mallampati Class: II Neck: normal appearance, no neck mass  Cardiac: normal rate/rhythm, normal s1, s2  Pulmonary: no resp distress, clear to auscultation bilaterally, no r/r/w Chest: no abnormalities Extremities: no clubbing, no cyanosis, no edema  Skin: normal color/ pigmentation  Psychiatric: oriented x3, normal affect

## 2024-08-07 NOTE — RESULTS/DATA
[TextEntry] : extensive data from Dr. Wagner's office reviewed:  last download data also reviewed.    PFT 08/07/2024 Spirometry (FEV1):  95 (%predicted) Ratio: 93 Lung volumes (TLC):   96(% predicted) Diffusion capacity (DLCO): 109 (% predicted) No significant bronchodilator response   Interpretation: Normal PFT

## 2024-08-07 NOTE — HISTORY OF PRESENT ILLNESS
[FreeTextEntry1] : The patient came in today to establish a new sleep medicine provider. The patient reports being diagnosed with severe sleep apnea approximately 18 years ago by Dr. Shields and was initially prescribed a BiPAP sleep mask. Over time, he stopped seeing Dr. Shields and began ordering CPAP supplies from [employer].  Last summer, the patient consulted with Dr. Wagner, a sleep specialist, who conducted sleep studies, including an at-home study and a center-based study. The studies revealed inadequate N3 sleep, leading Dr. Wagner to prescribe medications for insomnia, which helped him fall asleep easily. However, the patient reports difficulty tolerating any masks due to leaks, despite being clean-shaven. Dr. Wagner switched him to ASV (Adaptive Servo-Ventilation) as he continued to experience apneas with BiPAP. The patient has used the ASV for only six days in the last 30 due to leaks and frustration, despite trying various masks, including nasal and full-face masks. The only mask that has worked long-term is the FX Quattro mask.    Relevant Medications: - The patient takes oxycodone for back pain, which he acknowledges may cause central sleep apnea. - He also takes gabapentin and tizanidine, which may contribute to sleepiness.

## 2024-08-07 NOTE — PLAN
[TextEntry] : Sleep apnea (obstructive vs central). I am unable to get a temporal history based on the provided info. There are several sleep studies that shows LISBET, there is a three day home sleep study while on CPAP therapy, The patient reports having an attended sleep study but I do not see the resport. The patient is currently on ASV and is not able to tolerate the therapy.  There were some central events in the download but it is unclear if they are treatment emergent or primary central. he does report taking opiates that can cause central apnea events.   Plan; Long discussion with the patient - Repeat split night sleep study - If primary obstructive LISBET --> he is interested in [facility] Gave info of the surgeons.  Unclear prior sleep history. Hopefully will getter better clarity with this attended sleep study.  Time spent  65 min - including, history examination, detailed relevant data review, counselling education and discussion of treatment. Extended time spent reviewing external records.

## 2024-09-03 ENCOUNTER — APPOINTMENT (OUTPATIENT)
Dept: UROLOGY | Facility: CLINIC | Age: 49
End: 2024-09-03
Payer: MEDICARE

## 2024-09-03 VITALS
OXYGEN SATURATION: 97 % | BODY MASS INDEX: 28.49 KG/M2 | RESPIRATION RATE: 16 BRPM | SYSTOLIC BLOOD PRESSURE: 132 MMHG | HEART RATE: 66 BPM | WEIGHT: 215 LBS | HEIGHT: 73 IN | DIASTOLIC BLOOD PRESSURE: 84 MMHG

## 2024-09-03 DIAGNOSIS — E29.1 TESTICULAR HYPOFUNCTION: ICD-10-CM

## 2024-09-03 PROCEDURE — 99212 OFFICE O/P EST SF 10 MIN: CPT

## 2024-09-03 NOTE — ASSESSMENT
[FreeTextEntry1] : 49 yo M treated with TRT for hypogonadism, with improvement of hypogonadal symptoms. Repeat labs tomorrow. Continue TRT.

## 2024-09-03 NOTE — HISTORY OF PRESENT ILLNESS
[FreeTextEntry1] : 44 year old man with complaint of fatigue, low libido, and depressed mood. He had been on TRT by [facility]. His dose was titrate to T cypionate 170 mcg/week IM. On this dose, a recent assay of serum testosterone showed 738. His symptoms began years ago. His symptoms improve on this regimen, but have not completely resolved. No suicidal ideation. No symptoms of LISBET. No blurry vision or headaches. He reports erectile dysfunction, but good erections with sildenafil 20 mg (40 mg in half.) He complains of history or premature ejaculation, but on sildenafil now he has delayed or no orgasm. He does not want to preserve fertility.  Hypogonadal symptoms are moderate in severity. Nothing makes the symptoms better, nothing makes sx worse. It is associated with ED as described. No hematuria, no dysuria, no frequency, no urgency, no hesitancy, no straining. No incontinence.  No fevers, no chills, no nausea, no vomiting, no flank pain. No family history contributory to hypogonadism, ED, premature ejaculation.   11/14/2019: Patient presents for follow up. He presents to discuss his dosage of testosterone. While he had previously stated that he had taken 170 mg, now he says he thinks he had taken 1.7 mL. Call placed to his previous provider, who confirmed he was getting 300 mg weekly, or 1.5 mL of 200 mg/mL. He reports hypogonadal and  symptoms and other medical  issues remain unchanged from above. No hematuria, no dysuria, no frequency, no urgency, no hesitancy, no straining. No incontinence. No fevers, no chills, no nausea, no vomiting, no flank pain.   06/04/2020: Patient presents for follow up. He reports good improvement of his fatigue, libido with TRT. He is administering self injections without difficulty. He says he obtained labs, but does not have copy. He says Dr Lemus, his PCP, does have them. Does report mild ED. He had good response to sildenafil in the past, but not bothered right now as ED is mild. No new  symptoms and other medical  issues remain unchanged from above. No hematuria, no dysuria, no frequency, no urgency, no hesitancy, no straining. No incontinence. No fevers, no chills, no nausea, no vomiting, no flank pain.   11/10/2020: Patient presents for follow up. He reports good control of fatigue and libido with TRT. No problems with injections. He is awaiting C spine surgery so has neck pain, but no new  symptoms and other medical  issues remain unchanged from above. No hematuria, no dysuria, no frequency, no urgency, no hesitancy, no straining. No incontinence. No fevers, no chills, no nausea, no vomiting, no flank pain.   05/11/2021: Patient presents for follow up. He reports fatigue, libido, and overall energy improved with TRT. he does report being worked up for "adrenal fatigue" thinking that he has been more tired due to stress and cortisol release but is vague with the details. Testosterone 1525, but patient says he obtained labs day after TRT injection, not 1 week after. Of note, he is injecting 300 mg qWeekly.  09/07/2021: Patient presents for follow up. Repeat serum T was 1000. Feels hypogonadal symptoms remain well controlled. However, complains of ED with poor tumescence, rigidity, and duration.   11/22/2022: Patient presents for follow up. He reports erections have improved. SOme delayed orgasm since starting lexapro, but managing. Good control of hypogonadal symptoms with TRT. no complaints.  07/11/2023: Patient presents for follow up. He reports mood and libido have decreased. Rx is for 300 weekly, but he has split the dose to 150 twice weekly to reduce peaks and troughs. Also erections duration has decreased. He ran out of sildenafil. serum T was 350 in 4/2023 11/21/2023: Patient presents for follow up. He reports due to his elevated testosterone level of >1500 in 8/2023 patient reduced his TRT to 50 mg twice a week but notes low libido, weakness and feels tired. His repeat testosterone in 10/2023 is noted to be 224. No new  complaints. Had RICARDO with PCP this year.  05/09/2024: Patient presents for follow up. He reports comfortable on current dose of TRT. Good energy, libido. Serum T 978  09/03/2024: Patient presents for follow up. He reports nocturia x1, but attributes to aggressive hydration. No other complaints. TRT with good control of hypogonadal symptoms. Labs scheduled for tomorrow.

## 2024-09-04 ENCOUNTER — LABORATORY RESULT (OUTPATIENT)
Age: 49
End: 2024-09-04

## 2024-09-11 ENCOUNTER — OUTPATIENT (OUTPATIENT)
Dept: OUTPATIENT SERVICES | Facility: HOSPITAL | Age: 49
LOS: 1 days | End: 2024-09-11

## 2024-09-11 DIAGNOSIS — Z98.1 ARTHRODESIS STATUS: Chronic | ICD-10-CM

## 2024-09-11 DIAGNOSIS — Z98.890 OTHER SPECIFIED POSTPROCEDURAL STATES: Chronic | ICD-10-CM

## 2024-09-11 DIAGNOSIS — Z92.241 PERSONAL HISTORY OF SYSTEMIC STEROID THERAPY: Chronic | ICD-10-CM

## 2024-09-11 DIAGNOSIS — G47.33 OBSTRUCTIVE SLEEP APNEA (ADULT) (PEDIATRIC): ICD-10-CM

## 2024-09-11 PROCEDURE — 95800 SLP STDY UNATTENDED: CPT

## 2024-09-11 PROCEDURE — 95800 SLP STDY UNATTENDED: CPT | Mod: 26

## 2024-09-23 ENCOUNTER — NON-APPOINTMENT (OUTPATIENT)
Age: 49
End: 2024-09-23

## 2024-10-02 DIAGNOSIS — G47.33 OBSTRUCTIVE SLEEP APNEA (ADULT) (PEDIATRIC): ICD-10-CM

## 2024-10-31 ENCOUNTER — APPOINTMENT (OUTPATIENT)
Dept: INTERNAL MEDICINE | Facility: CLINIC | Age: 49
End: 2024-10-31
Payer: MEDICARE

## 2024-10-31 VITALS
HEIGHT: 73 IN | HEART RATE: 70 BPM | DIASTOLIC BLOOD PRESSURE: 60 MMHG | BODY MASS INDEX: 28.49 KG/M2 | TEMPERATURE: 98.5 F | WEIGHT: 215 LBS | SYSTOLIC BLOOD PRESSURE: 120 MMHG | RESPIRATION RATE: 16 BRPM | OXYGEN SATURATION: 96 %

## 2024-10-31 DIAGNOSIS — G47.33 OBSTRUCTIVE SLEEP APNEA (ADULT) (PEDIATRIC): ICD-10-CM

## 2024-10-31 PROCEDURE — 99215 OFFICE O/P EST HI 40 MIN: CPT

## 2024-12-04 ENCOUNTER — APPOINTMENT (OUTPATIENT)
Dept: INTERNAL MEDICINE | Facility: CLINIC | Age: 49
End: 2024-12-04

## 2024-12-16 ENCOUNTER — APPOINTMENT (OUTPATIENT)
Dept: UROLOGY | Facility: CLINIC | Age: 49
End: 2024-12-16

## 2025-03-24 ENCOUNTER — APPOINTMENT (OUTPATIENT)
Dept: INTERNAL MEDICINE | Facility: CLINIC | Age: 50
End: 2025-03-24
Payer: MEDICARE

## 2025-03-24 VITALS
SYSTOLIC BLOOD PRESSURE: 108 MMHG | OXYGEN SATURATION: 97 % | WEIGHT: 226.13 LBS | TEMPERATURE: 98 F | HEART RATE: 56 BPM | BODY MASS INDEX: 29.97 KG/M2 | DIASTOLIC BLOOD PRESSURE: 66 MMHG | HEIGHT: 73 IN

## 2025-03-24 DIAGNOSIS — G47.33 OBSTRUCTIVE SLEEP APNEA (ADULT) (PEDIATRIC): ICD-10-CM

## 2025-03-24 DIAGNOSIS — G47.19 OTHER HYPERSOMNIA: ICD-10-CM

## 2025-03-24 DIAGNOSIS — Z87.891 PERSONAL HISTORY OF NICOTINE DEPENDENCE: ICD-10-CM

## 2025-03-24 PROCEDURE — 99215 OFFICE O/P EST HI 40 MIN: CPT

## 2025-03-24 PROCEDURE — G2211 COMPLEX E/M VISIT ADD ON: CPT

## 2025-04-22 ENCOUNTER — APPOINTMENT (OUTPATIENT)
Dept: INTERNAL MEDICINE | Facility: CLINIC | Age: 50
End: 2025-04-22
Payer: MEDICARE

## 2025-04-22 VITALS
WEIGHT: 225 LBS | HEIGHT: 73 IN | HEART RATE: 73 BPM | TEMPERATURE: 98.1 F | RESPIRATION RATE: 16 BRPM | OXYGEN SATURATION: 97 % | SYSTOLIC BLOOD PRESSURE: 118 MMHG | DIASTOLIC BLOOD PRESSURE: 78 MMHG | BODY MASS INDEX: 29.82 KG/M2

## 2025-04-22 DIAGNOSIS — G47.33 OBSTRUCTIVE SLEEP APNEA (ADULT) (PEDIATRIC): ICD-10-CM

## 2025-04-22 DIAGNOSIS — Z01.811 ENCOUNTER FOR PREPROCEDURAL RESPIRATORY EXAMINATION: ICD-10-CM

## 2025-04-22 PROCEDURE — ZZZZZ: CPT

## 2025-04-22 PROCEDURE — 94060 EVALUATION OF WHEEZING: CPT

## 2025-04-22 PROCEDURE — 99214 OFFICE O/P EST MOD 30 MIN: CPT | Mod: 25

## 2025-04-22 PROCEDURE — 94729 DIFFUSING CAPACITY: CPT

## 2025-04-23 ENCOUNTER — APPOINTMENT (OUTPATIENT)
Dept: INTERNAL MEDICINE | Facility: CLINIC | Age: 50
End: 2025-04-23

## 2025-06-24 ENCOUNTER — APPOINTMENT (OUTPATIENT)
Dept: INTERNAL MEDICINE | Facility: CLINIC | Age: 50
End: 2025-06-24

## 2025-07-01 LAB
HCT VFR BLD CALC: 47.4 %
HGB BLD-MCNC: 15.1 G/DL

## 2025-07-04 LAB
TESTOST FREE SERPL-MCNC: 19.5 PG/ML
TESTOST SERPL-MCNC: 1119 NG/DL

## 2025-09-02 ENCOUNTER — APPOINTMENT (OUTPATIENT)
Dept: UROLOGY | Facility: CLINIC | Age: 50
End: 2025-09-02
Payer: MEDICARE

## 2025-09-02 VITALS
HEART RATE: 58 BPM | DIASTOLIC BLOOD PRESSURE: 80 MMHG | BODY MASS INDEX: 28.44 KG/M2 | SYSTOLIC BLOOD PRESSURE: 137 MMHG | HEIGHT: 72 IN | RESPIRATION RATE: 16 BRPM | WEIGHT: 210 LBS | OXYGEN SATURATION: 98 %

## 2025-09-02 DIAGNOSIS — E29.1 TESTICULAR HYPOFUNCTION: ICD-10-CM

## 2025-09-02 DIAGNOSIS — N40.0 BENIGN PROSTATIC HYPERPLASIA WITHOUT LOWER URINARY TRACT SYMPMS: ICD-10-CM

## 2025-09-02 DIAGNOSIS — Z12.5 ENCOUNTER FOR SCREENING FOR MALIGNANT NEOPLASM OF PROSTATE: ICD-10-CM

## 2025-09-02 PROCEDURE — G2211 COMPLEX E/M VISIT ADD ON: CPT

## 2025-09-02 PROCEDURE — 99214 OFFICE O/P EST MOD 30 MIN: CPT

## 2025-09-03 LAB — PSA SERPL-MCNC: 1 NG/ML
